# Patient Record
Sex: MALE | Race: WHITE | NOT HISPANIC OR LATINO | Employment: FULL TIME | URBAN - METROPOLITAN AREA
[De-identification: names, ages, dates, MRNs, and addresses within clinical notes are randomized per-mention and may not be internally consistent; named-entity substitution may affect disease eponyms.]

---

## 2017-03-16 ENCOUNTER — HOSPITAL ENCOUNTER (OUTPATIENT)
Dept: OTHER | Facility: HOSPITAL | Age: 37
Discharge: HOME | End: 2017-03-16
Attending: NURSE PRACTITIONER | Admitting: NURSE PRACTITIONER

## 2017-12-19 ENCOUNTER — HOSPITAL ENCOUNTER (OUTPATIENT)
Dept: OTHER | Facility: HOSPITAL | Age: 37
Discharge: HOME | End: 2017-12-19
Attending: NURSE PRACTITIONER | Admitting: NURSE PRACTITIONER

## 2017-12-19 ENCOUNTER — HOSPITAL ENCOUNTER (OUTPATIENT)
Dept: OTHER | Facility: HOSPITAL | Age: 37
Setting detail: SPECIMEN
Discharge: HOME | End: 2017-12-19
Attending: NURSE PRACTITIONER | Admitting: NURSE PRACTITIONER

## 2017-12-19 LAB
ALBUMIN SERPL-MCNC: 4.6 G/DL (ref 3.5–4.8)
ALBUMIN/GLOB SERPL: 1.5 {RATIO} (ref 1–1.7)
ALP SERPL-CCNC: 68 IU/L (ref 32–91)
ALT SERPL-CCNC: 34 IU/L (ref 17–63)
ANION GAP SERPL CALC-SCNC: 14.2 MMOL/L (ref 10–20)
AST SERPL-CCNC: 26 IU/L (ref 15–41)
BASOPHILS # BLD AUTO: 0.1 10*3/UL (ref 0–0.2)
BASOPHILS NFR BLD AUTO: 1 % (ref 0–2)
BILIRUB SERPL-MCNC: 0.7 MG/DL (ref 0.3–1.2)
BUN SERPL-MCNC: 13 MG/DL (ref 8–20)
BUN/CREAT SERPL: 13 (ref 6.2–20.3)
CALCIUM SERPL-MCNC: 9.9 MG/DL (ref 8.9–10.3)
CHLORIDE SERPL-SCNC: 99 MMOL/L (ref 101–111)
CHOLEST SERPL-MCNC: 214 MG/DL
CHOLEST/HDLC SERPL: 5.4 {RATIO}
CONV CO2: 28 MMOL/L (ref 22–32)
CONV LDL CHOLESTEROL DIRECT: 140 MG/DL (ref 0–100)
CONV TOTAL PROTEIN: 7.6 G/DL (ref 6.1–7.9)
CREAT UR-MCNC: 1 MG/DL (ref 0.7–1.2)
D DIMER PPP FEU-MCNC: 0.36 MG/L FEU (ref 0.17–0.59)
DIFFERENTIAL METHOD BLD: (no result)
EOSINOPHIL # BLD AUTO: 0.1 10*3/UL (ref 0–0.3)
EOSINOPHIL # BLD AUTO: 1 % (ref 0–3)
ERYTHROCYTE [DISTWIDTH] IN BLOOD BY AUTOMATED COUNT: 13.2 % (ref 11.5–14.5)
GLOBULIN UR ELPH-MCNC: 3 G/DL (ref 2.5–3.8)
GLUCOSE SERPL-MCNC: 85 MG/DL (ref 65–99)
HCT VFR BLD AUTO: 45.9 % (ref 40–54)
HDLC SERPL-MCNC: 40 MG/DL
HGB BLD-MCNC: 15.5 G/DL (ref 14–18)
IRON SATN MFR SERPL: 19 % (ref 20–50)
IRON SERPL-MCNC: 71 UG/DL (ref 45–182)
LDLC/HDLC SERPL: 3.5 {RATIO}
LIPID INTERPRETATION: ABNORMAL
LYMPHOCYTES # BLD AUTO: 3.4 10*3/UL (ref 0.8–4.8)
LYMPHOCYTES NFR BLD AUTO: 34 % (ref 18–42)
MAGNESIUM SERPL-MCNC: 1.7 MG/DL (ref 1.8–2.5)
MCH RBC QN AUTO: 29.6 PG (ref 26–32)
MCHC RBC AUTO-ENTMCNC: 33.8 G/DL (ref 32–36)
MCV RBC AUTO: 87.5 FL (ref 80–94)
MONOCYTES # BLD AUTO: 0.8 10*3/UL (ref 0.1–1.3)
MONOCYTES NFR BLD AUTO: 8 % (ref 2–11)
NEUTROPHILS # BLD AUTO: 5.7 10*3/UL (ref 2.3–8.6)
NEUTROPHILS NFR BLD AUTO: 56 % (ref 50–75)
NRBC BLD AUTO-RTO: 0 /100{WBCS}
NRBC/RBC NFR BLD MANUAL: 0 10*3/UL
PLATELET # BLD AUTO: 359 10*3/UL (ref 150–450)
PMV BLD AUTO: 7.4 FL (ref 7.4–10.4)
POTASSIUM SERPL-SCNC: 4.2 MMOL/L (ref 3.6–5.1)
RBC # BLD AUTO: 5.25 10*6/UL (ref 4.6–6)
SODIUM SERPL-SCNC: 137 MMOL/L (ref 136–144)
TIBC SERPL-MCNC: 375 UG/DL (ref 228–428)
TRIGL SERPL-MCNC: 235 MG/DL
VLDLC SERPL CALC-MCNC: 33.9 MG/DL
WBC # BLD AUTO: 10.1 10*3/UL (ref 4.5–11.5)

## 2019-01-17 ENCOUNTER — HOSPITAL ENCOUNTER (OUTPATIENT)
Dept: OTHER | Facility: HOSPITAL | Age: 39
Setting detail: SPECIMEN
Discharge: HOME | End: 2019-01-17
Attending: NURSE PRACTITIONER | Admitting: NURSE PRACTITIONER

## 2019-01-17 LAB
ALBUMIN SERPL-MCNC: 4.3 G/DL (ref 3.5–4.8)
ALBUMIN/GLOB SERPL: 1.3 {RATIO} (ref 1–1.7)
ALP SERPL-CCNC: 78 IU/L (ref 32–91)
ALT SERPL-CCNC: 35 IU/L (ref 17–63)
ANION GAP SERPL CALC-SCNC: 16 MMOL/L (ref 10–20)
AST SERPL-CCNC: 26 IU/L (ref 15–41)
BASOPHILS # BLD AUTO: 0.1 10*3/UL (ref 0–0.2)
BASOPHILS NFR BLD AUTO: 1 % (ref 0–2)
BILIRUB SERPL-MCNC: 0.6 MG/DL (ref 0.3–1.2)
BUN SERPL-MCNC: 10 MG/DL (ref 8–20)
BUN/CREAT SERPL: 11.1 (ref 6.2–20.3)
CALCIUM SERPL-MCNC: 9.3 MG/DL (ref 8.9–10.3)
CHLORIDE SERPL-SCNC: 99 MMOL/L (ref 101–111)
CHOLEST SERPL-MCNC: 231 MG/DL
CHOLEST/HDLC SERPL: 6.1 {RATIO}
CONV CO2: 22 MMOL/L (ref 22–32)
CONV LDL CHOLESTEROL DIRECT: 151 MG/DL (ref 0–100)
CONV TOTAL PROTEIN: 7.5 G/DL (ref 6.1–7.9)
CREAT UR-MCNC: 0.9 MG/DL (ref 0.7–1.2)
DIFFERENTIAL METHOD BLD: (no result)
EOSINOPHIL # BLD AUTO: 0.1 10*3/UL (ref 0–0.3)
EOSINOPHIL # BLD AUTO: 2 % (ref 0–3)
ERYTHROCYTE [DISTWIDTH] IN BLOOD BY AUTOMATED COUNT: 13.1 % (ref 11.5–14.5)
GLOBULIN UR ELPH-MCNC: 3.2 G/DL (ref 2.5–3.8)
GLUCOSE SERPL-MCNC: 88 MG/DL (ref 65–99)
HCT VFR BLD AUTO: 45.3 % (ref 40–54)
HDLC SERPL-MCNC: 38 MG/DL
HGB BLD-MCNC: 15.2 G/DL (ref 14–18)
LDLC/HDLC SERPL: 4 {RATIO}
LIPID INTERPRETATION: ABNORMAL
LYMPHOCYTES # BLD AUTO: 3 10*3/UL (ref 0.8–4.8)
LYMPHOCYTES NFR BLD AUTO: 36 % (ref 18–42)
MAGNESIUM SERPL-MCNC: 1.9 MG/DL (ref 1.8–2.5)
MCH RBC QN AUTO: 29.5 PG (ref 26–32)
MCHC RBC AUTO-ENTMCNC: 33.6 G/DL (ref 32–36)
MCV RBC AUTO: 87.7 FL (ref 80–94)
MONOCYTES # BLD AUTO: 0.9 10*3/UL (ref 0.1–1.3)
MONOCYTES NFR BLD AUTO: 10 % (ref 2–11)
NEUTROPHILS # BLD AUTO: 4.3 10*3/UL (ref 2.3–8.6)
NEUTROPHILS NFR BLD AUTO: 51 % (ref 50–75)
NRBC BLD AUTO-RTO: 0 /100{WBCS}
NRBC/RBC NFR BLD MANUAL: 0 10*3/UL
PLATELET # BLD AUTO: 344 10*3/UL (ref 150–450)
PMV BLD AUTO: 7.7 FL (ref 7.4–10.4)
POTASSIUM SERPL-SCNC: 4 MMOL/L (ref 3.6–5.1)
RBC # BLD AUTO: 5.17 10*6/UL (ref 4.6–6)
SODIUM SERPL-SCNC: 133 MMOL/L (ref 136–144)
TRIGL SERPL-MCNC: 235 MG/DL
VLDLC SERPL CALC-MCNC: 41.8 MG/DL
WBC # BLD AUTO: 8.4 10*3/UL (ref 4.5–11.5)

## 2019-03-20 ENCOUNTER — OFFICE (AMBULATORY)
Dept: URBAN - METROPOLITAN AREA CLINIC 64 | Facility: CLINIC | Age: 39
End: 2019-03-20

## 2019-03-20 VITALS
WEIGHT: 294 LBS | HEIGHT: 73 IN | DIASTOLIC BLOOD PRESSURE: 80 MMHG | SYSTOLIC BLOOD PRESSURE: 123 MMHG | HEART RATE: 89 BPM

## 2019-03-20 DIAGNOSIS — R19.7 DIARRHEA, UNSPECIFIED: ICD-10-CM

## 2019-03-20 DIAGNOSIS — K62.5 HEMORRHAGE OF ANUS AND RECTUM: ICD-10-CM

## 2019-03-20 DIAGNOSIS — R10.30 LOWER ABDOMINAL PAIN, UNSPECIFIED: ICD-10-CM

## 2019-03-20 PROCEDURE — 99203 OFFICE O/P NEW LOW 30 MIN: CPT | Performed by: NURSE PRACTITIONER

## 2019-03-20 RX ORDER — DICYCLOMINE HYDROCHLORIDE 10 MG/1
40 CAPSULE ORAL
Qty: 60 | Refills: 11 | Status: COMPLETED
Start: 2019-03-20 | End: 2019-07-10

## 2019-06-17 ENCOUNTER — OFFICE VISIT (OUTPATIENT)
Dept: FAMILY MEDICINE CLINIC | Facility: CLINIC | Age: 39
End: 2019-06-17

## 2019-06-17 VITALS
SYSTOLIC BLOOD PRESSURE: 147 MMHG | HEART RATE: 73 BPM | DIASTOLIC BLOOD PRESSURE: 84 MMHG | TEMPERATURE: 98.9 F | OXYGEN SATURATION: 96 % | WEIGHT: 292.2 LBS | HEIGHT: 73 IN | BODY MASS INDEX: 38.73 KG/M2

## 2019-06-17 DIAGNOSIS — J06.9 ACUTE URI: Primary | ICD-10-CM

## 2019-06-17 PROBLEM — E83.42 HYPOMAGNESEMIA: Status: ACTIVE | Noted: 2019-01-17

## 2019-06-17 PROCEDURE — 99213 OFFICE O/P EST LOW 20 MIN: CPT | Performed by: NURSE PRACTITIONER

## 2019-06-17 RX ORDER — LISINOPRIL 10 MG/1
TABLET ORAL
COMMUNITY
Start: 2013-09-27 | End: 2020-03-09 | Stop reason: SDUPTHER

## 2019-06-17 RX ORDER — AMOXICILLIN AND CLAVULANATE POTASSIUM 875; 125 MG/1; MG/1
1 TABLET, FILM COATED ORAL 2 TIMES DAILY
Qty: 20 TABLET | Refills: 0 | Status: SHIPPED | OUTPATIENT
Start: 2019-06-17 | End: 2019-06-27

## 2019-06-17 RX ORDER — ESCITALOPRAM OXALATE 10 MG/1
TABLET ORAL EVERY 24 HOURS
COMMUNITY
Start: 2018-12-06 | End: 2019-06-26 | Stop reason: SDUPTHER

## 2019-06-17 RX ORDER — METOPROLOL SUCCINATE 50 MG/1
TABLET, EXTENDED RELEASE ORAL
COMMUNITY
Start: 2013-09-20 | End: 2019-10-30 | Stop reason: SDUPTHER

## 2019-06-17 RX ORDER — METHYLPREDNISOLONE 4 MG/1
4 TABLET ORAL DAILY
Qty: 5 TABLET | Refills: 0 | Status: SHIPPED | OUTPATIENT
Start: 2019-06-17 | End: 2019-06-22

## 2019-06-17 RX ORDER — PANTOPRAZOLE SODIUM 40 MG/1
TABLET, DELAYED RELEASE ORAL
COMMUNITY
Start: 2019-01-17 | End: 2019-08-12 | Stop reason: SDUPTHER

## 2019-06-17 NOTE — PROGRESS NOTES
Subjective   Lonnie Green is a 39 y.o. male who presents today with URI symptoms x 1 month.     Sinusitis   Associated symptoms include congestion and sinus pressure. Pertinent negatives include no chills, coughing, ear pain, sneezing, sore throat or swollen glands.        The following portions of the patient's history were reviewed and updated as appropriate: allergies, current medications, past family history, past medical history, past social history, past surgical history and problem list.    Review of Systems   Constitutional: Negative for activity change, chills and fatigue.   HENT: Positive for congestion, postnasal drip and sinus pressure. Negative for ear discharge, ear pain, facial swelling, sneezing, sore throat, tinnitus and trouble swallowing.    Eyes: Negative for blurred vision, double vision, photophobia, itching and visual disturbance.   Respiratory: Negative for apnea, cough, choking, wheezing and stridor.    Cardiovascular: Negative for chest pain.       Objective   Physical Exam   Constitutional: He appears well-developed and well-nourished.   HENT:   Head: Normocephalic and atraumatic.   Right Ear: External ear normal.   Left Ear: External ear normal.   Nose: Rhinorrhea and congestion present. Right sinus exhibits maxillary sinus tenderness and frontal sinus tenderness. Left sinus exhibits frontal sinus tenderness.   Eyes: Conjunctivae and EOM are normal. Pupils are equal, round, and reactive to light. Right eye exhibits no discharge. Left eye exhibits no discharge.   Cardiovascular: Normal rate, regular rhythm and normal heart sounds.   Pulmonary/Chest: Effort normal and breath sounds normal. No stridor. No respiratory distress. He has no wheezes. He has no rales. He exhibits no tenderness.         Assessment/Plan   Lonnie was seen today for cough, sinusitis and nasal congestion.    Diagnoses and all orders for this visit:    Acute URI  Comments:  Treat with augmentin x 10 days.  Medrol dose  pack    Other orders  -     amoxicillin-clavulanate (AUGMENTIN) 875-125 MG per tablet; Take 1 tablet by mouth 2 (Two) Times a Day for 10 days.  -     methylPREDNISolone (MEDROL) 4 MG tablet; Take 1 tablet by mouth Daily for 5 doses.

## 2019-06-17 NOTE — PATIENT INSTRUCTIONS

## 2019-06-26 RX ORDER — ESCITALOPRAM OXALATE 10 MG/1
10 TABLET ORAL DAILY
Qty: 90 TABLET | Refills: 0 | Status: SHIPPED | OUTPATIENT
Start: 2019-06-26 | End: 2019-11-06 | Stop reason: SDUPTHER

## 2019-07-11 ENCOUNTER — ON CAMPUS - OUTPATIENT (AMBULATORY)
Dept: URBAN - METROPOLITAN AREA HOSPITAL 2 | Facility: HOSPITAL | Age: 39
End: 2019-07-11
Payer: COMMERCIAL

## 2019-07-11 ENCOUNTER — OFFICE (AMBULATORY)
Dept: URBAN - METROPOLITAN AREA PATHOLOGY 4 | Facility: PATHOLOGY | Age: 39
End: 2019-07-11
Payer: COMMERCIAL

## 2019-07-11 VITALS
TEMPERATURE: 97.2 F | WEIGHT: 208 LBS | HEART RATE: 76 BPM | SYSTOLIC BLOOD PRESSURE: 164 MMHG | SYSTOLIC BLOOD PRESSURE: 156 MMHG | HEART RATE: 86 BPM | HEART RATE: 96 BPM | DIASTOLIC BLOOD PRESSURE: 103 MMHG | RESPIRATION RATE: 16 BRPM | HEART RATE: 84 BPM | SYSTOLIC BLOOD PRESSURE: 131 MMHG | OXYGEN SATURATION: 98 % | HEART RATE: 88 BPM | SYSTOLIC BLOOD PRESSURE: 127 MMHG | DIASTOLIC BLOOD PRESSURE: 79 MMHG | RESPIRATION RATE: 18 BRPM | SYSTOLIC BLOOD PRESSURE: 126 MMHG | DIASTOLIC BLOOD PRESSURE: 87 MMHG | HEART RATE: 68 BPM | OXYGEN SATURATION: 97 % | DIASTOLIC BLOOD PRESSURE: 78 MMHG | DIASTOLIC BLOOD PRESSURE: 72 MMHG | DIASTOLIC BLOOD PRESSURE: 74 MMHG | SYSTOLIC BLOOD PRESSURE: 152 MMHG | SYSTOLIC BLOOD PRESSURE: 128 MMHG | RESPIRATION RATE: 20 BRPM | HEIGHT: 73 IN | HEART RATE: 73 BPM | OXYGEN SATURATION: 96 % | SYSTOLIC BLOOD PRESSURE: 134 MMHG

## 2019-07-11 DIAGNOSIS — D12.3 BENIGN NEOPLASM OF TRANSVERSE COLON: ICD-10-CM

## 2019-07-11 DIAGNOSIS — D12.2 BENIGN NEOPLASM OF ASCENDING COLON: ICD-10-CM

## 2019-07-11 DIAGNOSIS — R19.7 DIARRHEA, UNSPECIFIED: ICD-10-CM

## 2019-07-11 DIAGNOSIS — R10.30 LOWER ABDOMINAL PAIN, UNSPECIFIED: ICD-10-CM

## 2019-07-11 DIAGNOSIS — K62.5 HEMORRHAGE OF ANUS AND RECTUM: ICD-10-CM

## 2019-07-11 DIAGNOSIS — K64.0 FIRST DEGREE HEMORRHOIDS: ICD-10-CM

## 2019-07-11 LAB
GI HISTOLOGY: A. UNSPECIFIED: (no result)
GI HISTOLOGY: B. UNSPECIFIED: (no result)
GI HISTOLOGY: C. UNSPECIFIED: (no result)
GI HISTOLOGY: PDF REPORT: (no result)

## 2019-07-11 PROCEDURE — 45380 COLONOSCOPY AND BIOPSY: CPT | Mod: 59 | Performed by: INTERNAL MEDICINE

## 2019-07-11 PROCEDURE — 45385 COLONOSCOPY W/LESION REMOVAL: CPT | Performed by: INTERNAL MEDICINE

## 2019-07-11 PROCEDURE — 88305 TISSUE EXAM BY PATHOLOGIST: CPT | Performed by: INTERNAL MEDICINE

## 2019-07-11 NOTE — SERVICEHPINOTES
LIBRA MONDRAGON  is a  39  male   who presents today for a  Colonoscopy   for   the indications listed below. The updated Patient Profile was reviewed prior to the procedure, in conjunction with the Physical Exam, including medical conditions, surgical procedures, medications, allergies, family history and social history. See Physical Exam time stamp below for date and time of HPI completion.Pre-operatively, I reviewed the indication(s) for the procedure, the risks of the procedure [including but not limited to: unexpected bleeding possibly requiring hospitalization and/or unplanned repeat procedures, perforation possibly requiring surgical treatment, missed lesions and complications of sedation/MAC (also explained by anesthesia staff)]. I have evaluated the patient for risks associated with the planned anesthesia and the procedure to be performed and find the patient an acceptable candidate for IV sedation.Multiple opportunities were provided for any questions or concerns, and all questions were answered satisfactorily before any anesthesia was administered. We will proceed with the planned procedure.BR

## 2019-08-13 RX ORDER — PANTOPRAZOLE SODIUM 40 MG/1
40 TABLET, DELAYED RELEASE ORAL DAILY
Qty: 90 TABLET | Refills: 0 | Status: SHIPPED | OUTPATIENT
Start: 2019-08-13 | End: 2019-10-30 | Stop reason: SDUPTHER

## 2019-10-30 RX ORDER — PANTOPRAZOLE SODIUM 40 MG/1
40 TABLET, DELAYED RELEASE ORAL DAILY
Qty: 90 TABLET | Refills: 0 | Status: SHIPPED | OUTPATIENT
Start: 2019-10-30 | End: 2020-02-17 | Stop reason: SDUPTHER

## 2019-10-30 RX ORDER — METOPROLOL SUCCINATE 50 MG/1
50 TABLET, EXTENDED RELEASE ORAL DAILY
Qty: 90 TABLET | Refills: 0 | Status: SHIPPED | OUTPATIENT
Start: 2019-10-30 | End: 2020-01-27 | Stop reason: SDUPTHER

## 2019-11-06 RX ORDER — ESCITALOPRAM OXALATE 10 MG/1
10 TABLET ORAL DAILY
Qty: 90 TABLET | Refills: 0 | Status: SHIPPED | OUTPATIENT
Start: 2019-11-06 | End: 2020-03-10 | Stop reason: SDUPTHER

## 2020-01-28 RX ORDER — METOPROLOL SUCCINATE 50 MG/1
50 TABLET, EXTENDED RELEASE ORAL DAILY
Qty: 90 TABLET | Refills: 0 | Status: SHIPPED | OUTPATIENT
Start: 2020-01-28 | End: 2020-03-24

## 2020-02-17 RX ORDER — PANTOPRAZOLE SODIUM 40 MG/1
40 TABLET, DELAYED RELEASE ORAL DAILY
Qty: 30 TABLET | Refills: 0 | Status: SHIPPED | OUTPATIENT
Start: 2020-02-17 | End: 2020-03-16

## 2020-03-03 ENCOUNTER — TELEPHONE (OUTPATIENT)
Dept: FAMILY MEDICINE CLINIC | Facility: CLINIC | Age: 40
End: 2020-03-03

## 2020-03-03 NOTE — TELEPHONE ENCOUNTER
rx called and said they have faxed and left message for ma to call and approve the rx the needs to be refilled for patient, thanks

## 2020-03-04 NOTE — TELEPHONE ENCOUNTER
Attempted to contact the patient.  No answer, so I left a vm letting him know that he needs to call and tell me what he needs refilled and also needs to schedule appt with krys.

## 2020-03-09 RX ORDER — LISINOPRIL 10 MG/1
10 TABLET ORAL DAILY
Qty: 30 TABLET | Refills: 0 | Status: SHIPPED | OUTPATIENT
Start: 2020-03-09 | End: 2020-03-24

## 2020-03-10 RX ORDER — ESCITALOPRAM OXALATE 10 MG/1
10 TABLET ORAL DAILY
Qty: 90 TABLET | Refills: 0 | Status: SHIPPED | OUTPATIENT
Start: 2020-03-10 | End: 2020-04-14 | Stop reason: SDUPTHER

## 2020-03-16 RX ORDER — PANTOPRAZOLE SODIUM 40 MG/1
40 TABLET, DELAYED RELEASE ORAL DAILY
Qty: 30 TABLET | Refills: 0 | Status: SHIPPED | OUTPATIENT
Start: 2020-03-16 | End: 2020-04-14 | Stop reason: SDUPTHER

## 2020-03-24 RX ORDER — LISINOPRIL 10 MG/1
TABLET ORAL
Qty: 90 TABLET | Refills: 0 | Status: SHIPPED | OUTPATIENT
Start: 2020-03-24 | End: 2020-04-14 | Stop reason: SDUPTHER

## 2020-03-24 RX ORDER — METOPROLOL SUCCINATE 50 MG/1
TABLET, EXTENDED RELEASE ORAL
Qty: 90 TABLET | Refills: 0 | Status: SHIPPED | OUTPATIENT
Start: 2020-03-24 | End: 2020-06-23

## 2020-04-14 ENCOUNTER — OFFICE VISIT (OUTPATIENT)
Dept: FAMILY MEDICINE CLINIC | Facility: CLINIC | Age: 40
End: 2020-04-14

## 2020-04-14 VITALS — HEIGHT: 73 IN | WEIGHT: 277 LBS | BODY MASS INDEX: 36.71 KG/M2

## 2020-04-14 DIAGNOSIS — E78.2 MIXED HYPERLIPIDEMIA: ICD-10-CM

## 2020-04-14 DIAGNOSIS — K21.9 GASTROESOPHAGEAL REFLUX DISEASE WITHOUT ESOPHAGITIS: ICD-10-CM

## 2020-04-14 DIAGNOSIS — F41.9 ANXIETY: ICD-10-CM

## 2020-04-14 DIAGNOSIS — I10 ESSENTIAL HYPERTENSION: Primary | ICD-10-CM

## 2020-04-14 PROCEDURE — 99443 PR PHYS/QHP TELEPHONE EVALUATION 21-30 MIN: CPT | Performed by: NURSE PRACTITIONER

## 2020-04-14 RX ORDER — LISINOPRIL 10 MG/1
10 TABLET ORAL DAILY
Qty: 90 TABLET | Refills: 0 | Status: SHIPPED | OUTPATIENT
Start: 2020-04-14 | End: 2020-07-27 | Stop reason: SDUPTHER

## 2020-04-14 RX ORDER — PANTOPRAZOLE SODIUM 40 MG/1
40 TABLET, DELAYED RELEASE ORAL DAILY
Qty: 90 TABLET | Refills: 0 | Status: SHIPPED | OUTPATIENT
Start: 2020-04-14 | End: 2020-07-08

## 2020-04-14 RX ORDER — ESCITALOPRAM OXALATE 10 MG/1
10 TABLET ORAL DAILY
Qty: 90 TABLET | Refills: 0 | Status: SHIPPED | OUTPATIENT
Start: 2020-04-14 | End: 2020-07-08

## 2020-04-14 NOTE — PROGRESS NOTES
Chief Complaint   Patient presents with   • Establish Care   • Med Refill      You have chosen to receive care through a telephone visit. Do you consent to use a telephone visit for your medical care today? Yes      HPI    HTN, stable on meds and takes as directed, denies chest pain, headache, shortness of air, palpitations and swelling of extremities.  Checks BP at home and ranges [id number] daily, reports heart rate lately ranges from , with slight increase in anxiety due to current pandemic situation.    Hyperlipidemia, pt started on atorvastatin last year, could not lisa d/t s/e. He denies constipation, diarrhea, GI upset, fatigue, exercise intolerance, dyspnea, palpitations, syncope and pedal edema.  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      Anxiety, feels stable on Lexapro, needs rf.  Patient denies significant weight loss/gain, insomnia, hypersomnia, psychomotor agitation, psychomotor retardation, fatigue (loss of energy), feelings of worthlessness (guilt), impaired concentration (indecisiveness), thoughts of death or suicide.      GERD, stable on medication, denies nausea, vomiting, constipation, abdominal pain and diarrhea. Needs protonix rf.              Past Medical History:   Diagnosis Date   • GERD (gastroesophageal reflux disease)    • Hypertension    • Incisional hernia    • Pneumonia    • Tachycardia    • Umbilical hernia      Past Surgical History:   Procedure Laterality Date   • CHOLECYSTECTOMY  06/26/2013   • INCISIONAL HERNIA REPAIR  02/02/2015   • TONSILLECTOMY  1990     Family History   Problem Relation Age of Onset   • Diabetes Mother    • Stroke Mother    • Hypertension Father    • Hypertension Other    • Cancer Other         Abstraction from [facility] - Lung Cancer     Social History     Tobacco Use   • Smoking status: Former Smoker   • Smokeless tobacco: Never Used   Substance Use Topics   • Alcohol use: Yes     Frequency: Monthly or less         Current Outpatient  Medications:   •  escitalopram (Lexapro) 10 MG tablet, Take 1 tablet by mouth Daily., Disp: 90 tablet, Rfl: 0  •  lisinopril (PRINIVIL,ZESTRIL) 10 MG tablet, Take 1 tablet by mouth Daily., Disp: 90 tablet, Rfl: 0  •  metoprolol succinate XL (TOPROL-XL) 50 MG 24 hr tablet, TAKE 1 TABLET BY MOUTH ONCE DAILY, Disp: 90 tablet, Rfl: 0  •  pantoprazole (PROTONIX) 40 MG EC tablet, Take 1 tablet by mouth Daily., Disp: 90 tablet, Rfl: 0    PHQ-2 Depression Screening  Little interest or pleasure in doing things? 0   Feeling down, depressed, or hopeless? 0   PHQ-2 Total Score 0     PHQ-9 Depression Screening  Little interest or pleasure in doing things? 0   Feeling down, depressed, or hopeless? 0   Trouble falling or staying asleep, or sleeping too much?     Feeling tired or having little energy?     Poor appetite or overeating?     Feeling bad about yourself - or that you are a failure or have let yourself or your family down?     Trouble concentrating on things, such as reading the newspaper or watching television?     Moving or speaking so slowly that other people could have noticed? Or the opposite - being so fidgety or restless that you have been moving around a lot more than usual?     Thoughts that you would be better off dead, or of hurting yourself in some way?     PHQ-9 Total Score 0   If you checked off any problems, how difficult have these problems made it for you to do your work, take care of things at home, or get along with other people?       The following portions of the patient's history were reviewed and updated as appropriate: allergies, current medications, past family history, past medical history, past social history, past surgical history and problem list.    Review of Systems   Constitutional: Negative for chills, fatigue and fever.   HENT: Negative for dental problem, ear pain, sinus pressure and sore throat.    Eyes: Negative for visual disturbance.   Respiratory: Negative for cough, shortness of  "breath and wheezing.    Cardiovascular: Negative for chest pain, palpitations and leg swelling.        HR up to 100 at times   Gastrointestinal: Negative for abdominal pain, blood in stool, constipation, diarrhea, nausea, vomiting and GERD.   Genitourinary: Negative for difficulty urinating, frequency, urgency and urinary incontinence.   Musculoskeletal: Negative for arthralgias, back pain, gait problem, joint swelling, myalgias and neck pain.   Skin: Negative for dry skin, pallor and rash.   Neurological: Negative for dizziness, seizures, speech difficulty and weakness.   Hematological: Negative for adenopathy.   Psychiatric/Behavioral: Negative for sleep disturbance, depressed mood and stress. The patient is nervous/anxious.         Vitals:    04/14/20 0753   Weight: 126 kg (277 lb)   Height: 185.4 cm (72.99\")     Body mass index is 36.55 kg/m².     Physical Exam   Constitutional: He is oriented to person, place, and time. No distress.   Exam otherwise deferred through video or audio visit   Pulmonary/Chest: Effort normal.   Neurological: He is alert and oriented to person, place, and time.   Psychiatric: He has a normal mood and affect. His behavior is normal. Judgment and thought content normal.        No visits with results within 7 Day(s) from this visit.   Latest known visit with results is:   No results found for any previous visit.       Diagnoses and all orders for this visit:    1. Essential hypertension (Primary)  -     lisinopril (PRINIVIL,ZESTRIL) 10 MG tablet; Take 1 tablet by mouth Daily.  Dispense: 90 tablet; Refill: 0  -     Comprehensive Metabolic Panel; Future  -     CBC (No Diff); Future  -     TSH; Future    2. Mixed hyperlipidemia  -     Lipid Panel; Future    3. Gastroesophageal reflux disease without esophagitis  -     pantoprazole (PROTONIX) 40 MG EC tablet; Take 1 tablet by mouth Daily.  Dispense: 90 tablet; Refill: 0    4. Anxiety  -     escitalopram (Lexapro) 10 MG tablet; Take 1 tablet " by mouth Daily.  Dispense: 90 tablet; Refill: 0      Conditions above stable, rf meds  Return for labs prior to next apt in 3 mo fasting.   Call or return earlier if needed.     This was an audio enabled telemedicine encounter. Total time of discussion was 22 minutes

## 2020-06-23 RX ORDER — METOPROLOL SUCCINATE 50 MG/1
TABLET, EXTENDED RELEASE ORAL
Qty: 90 TABLET | Refills: 0 | Status: SHIPPED | OUTPATIENT
Start: 2020-06-23 | End: 2020-09-17

## 2020-07-06 ENCOUNTER — CLINICAL SUPPORT (OUTPATIENT)
Dept: FAMILY MEDICINE CLINIC | Facility: CLINIC | Age: 40
End: 2020-07-06

## 2020-07-06 DIAGNOSIS — E78.2 MIXED HYPERLIPIDEMIA: ICD-10-CM

## 2020-07-06 DIAGNOSIS — I10 ESSENTIAL HYPERTENSION: ICD-10-CM

## 2020-07-08 DIAGNOSIS — F41.9 ANXIETY: ICD-10-CM

## 2020-07-08 DIAGNOSIS — K21.9 GASTROESOPHAGEAL REFLUX DISEASE WITHOUT ESOPHAGITIS: ICD-10-CM

## 2020-07-08 RX ORDER — ESCITALOPRAM OXALATE 10 MG/1
10 TABLET ORAL DAILY
Qty: 90 TABLET | Refills: 0 | Status: SHIPPED | OUTPATIENT
Start: 2020-07-08 | End: 2020-12-30

## 2020-07-08 RX ORDER — PANTOPRAZOLE SODIUM 40 MG/1
40 TABLET, DELAYED RELEASE ORAL DAILY
Qty: 90 TABLET | Refills: 0 | Status: SHIPPED | OUTPATIENT
Start: 2020-07-08 | End: 2020-09-28

## 2020-07-27 DIAGNOSIS — I10 ESSENTIAL HYPERTENSION: ICD-10-CM

## 2020-07-27 RX ORDER — LISINOPRIL 10 MG/1
10 TABLET ORAL DAILY
Qty: 90 TABLET | Refills: 0 | Status: SHIPPED | OUTPATIENT
Start: 2020-07-27 | End: 2021-01-07 | Stop reason: SDUPTHER

## 2020-09-17 RX ORDER — METOPROLOL SUCCINATE 50 MG/1
TABLET, EXTENDED RELEASE ORAL
Qty: 90 TABLET | Refills: 0 | Status: SHIPPED | OUTPATIENT
Start: 2020-09-17 | End: 2020-12-23 | Stop reason: SDUPTHER

## 2020-09-28 DIAGNOSIS — K21.9 GASTROESOPHAGEAL REFLUX DISEASE WITHOUT ESOPHAGITIS: ICD-10-CM

## 2020-09-28 RX ORDER — PANTOPRAZOLE SODIUM 40 MG/1
40 TABLET, DELAYED RELEASE ORAL DAILY
Qty: 90 TABLET | Refills: 0 | Status: SHIPPED | OUTPATIENT
Start: 2020-09-28 | End: 2020-12-30

## 2020-12-23 ENCOUNTER — TELEPHONE (OUTPATIENT)
Dept: FAMILY MEDICINE CLINIC | Facility: CLINIC | Age: 40
End: 2020-12-23

## 2020-12-23 DIAGNOSIS — I10 ESSENTIAL HYPERTENSION: ICD-10-CM

## 2020-12-23 RX ORDER — METOPROLOL SUCCINATE 50 MG/1
50 TABLET, EXTENDED RELEASE ORAL DAILY
Qty: 90 TABLET | Refills: 0 | Status: SHIPPED | OUTPATIENT
Start: 2020-12-23 | End: 2021-01-07 | Stop reason: SDUPTHER

## 2020-12-23 NOTE — TELEPHONE ENCOUNTER
Sent, pt has several other meds that likely need refilled. He needs appt please for re-eval. Thanks.

## 2020-12-23 NOTE — TELEPHONE ENCOUNTER
Pt called today to request refill of metoprolol.  The request was sent to you on 12/21/20.  Can you refill today if you have the chance?  Thank you.

## 2020-12-30 DIAGNOSIS — F41.9 ANXIETY: ICD-10-CM

## 2020-12-30 DIAGNOSIS — K21.9 GASTROESOPHAGEAL REFLUX DISEASE WITHOUT ESOPHAGITIS: ICD-10-CM

## 2020-12-30 RX ORDER — ESCITALOPRAM OXALATE 10 MG/1
10 TABLET ORAL DAILY
Qty: 90 TABLET | Refills: 0 | Status: SHIPPED | OUTPATIENT
Start: 2020-12-30 | End: 2021-01-07 | Stop reason: SDUPTHER

## 2020-12-30 RX ORDER — PANTOPRAZOLE SODIUM 40 MG/1
40 TABLET, DELAYED RELEASE ORAL DAILY
Qty: 90 TABLET | Refills: 0 | Status: SHIPPED | OUTPATIENT
Start: 2020-12-30 | End: 2021-01-07 | Stop reason: SDUPTHER

## 2020-12-30 NOTE — TELEPHONE ENCOUNTER
LOV 4/14/20  Next OV 1/7/21    Last refill:  Pantoprazole 9/28/20 #90 with no refills  escitalopram 7/8/20 #90 with no refills

## 2021-01-07 ENCOUNTER — OFFICE VISIT (OUTPATIENT)
Dept: FAMILY MEDICINE CLINIC | Facility: CLINIC | Age: 41
End: 2021-01-07

## 2021-01-07 VITALS
TEMPERATURE: 96.9 F | WEIGHT: 302.6 LBS | DIASTOLIC BLOOD PRESSURE: 87 MMHG | HEART RATE: 85 BPM | SYSTOLIC BLOOD PRESSURE: 129 MMHG | HEIGHT: 74 IN | OXYGEN SATURATION: 96 % | BODY MASS INDEX: 38.84 KG/M2

## 2021-01-07 DIAGNOSIS — Z00.00 PREVENTATIVE HEALTH CARE: ICD-10-CM

## 2021-01-07 DIAGNOSIS — I10 ESSENTIAL HYPERTENSION: ICD-10-CM

## 2021-01-07 DIAGNOSIS — F41.9 ANXIETY: ICD-10-CM

## 2021-01-07 DIAGNOSIS — K21.00 GASTROESOPHAGEAL REFLUX DISEASE WITH ESOPHAGITIS WITHOUT HEMORRHAGE: Primary | ICD-10-CM

## 2021-01-07 DIAGNOSIS — M79.661 PAIN AND SWELLING OF RIGHT LOWER LEG: ICD-10-CM

## 2021-01-07 DIAGNOSIS — M79.89 PAIN AND SWELLING OF RIGHT LOWER LEG: ICD-10-CM

## 2021-01-07 DIAGNOSIS — E78.2 MIXED HYPERLIPIDEMIA: ICD-10-CM

## 2021-01-07 PROCEDURE — 99214 OFFICE O/P EST MOD 30 MIN: CPT | Performed by: NURSE PRACTITIONER

## 2021-01-07 RX ORDER — PANTOPRAZOLE SODIUM 40 MG/1
40 TABLET, DELAYED RELEASE ORAL 2 TIMES DAILY
Qty: 180 TABLET | Refills: 1 | Status: SHIPPED | OUTPATIENT
Start: 2021-01-07 | End: 2021-07-12

## 2021-01-07 RX ORDER — ESCITALOPRAM OXALATE 10 MG/1
10 TABLET ORAL DAILY
Qty: 90 TABLET | Refills: 1 | Status: SHIPPED | OUTPATIENT
Start: 2021-01-07 | End: 2021-10-22

## 2021-01-07 RX ORDER — LISINOPRIL 10 MG/1
10 TABLET ORAL DAILY
Qty: 90 TABLET | Refills: 1 | Status: SHIPPED | OUTPATIENT
Start: 2021-01-07 | End: 2021-11-16

## 2021-01-07 RX ORDER — METOPROLOL SUCCINATE 50 MG/1
50 TABLET, EXTENDED RELEASE ORAL DAILY
Qty: 90 TABLET | Refills: 1 | Status: SHIPPED | OUTPATIENT
Start: 2021-01-07 | End: 2021-07-12

## 2021-01-07 NOTE — PROGRESS NOTES
"Chief Complaint  Hypertension, Hyperlipidemia, Anxiety, Follow-up (from 4/14/20), Heartburn (feels like med isn't working), and Leg Pain (rt leg, was told by  it was a muscle strain in oct but still hurting.)    Subjective          Lonnie Green presents to [facility] for   History of Present Illness    HTN, stable on meds and takes as directed, denies chest pain, headache, shortness of air, palpitations and does report swelling of the RLE w/ calf pain.     Hyperlipidemia, mild, not on statin. The patient denies muscle aches, constipation, diarrhea, GI upset, fatigue, chest pain/pressure, exercise intolerance, dyspnea, palpitations, syncope      GERD, pantop not effective once daily, tried omeprazole, name brand prilosec, nexium and esomeprazole all ineffective, dexilant was effective but too expensive-pt on high deductible plan. He drinks strong coffee through the day, no spicy foods, with daily reflux denies nausea, vomiting, constipation, abdominal pain and diarrhea.    Anxiety, stable on lexapro, patient denies significant weight loss/gain, insomnia, hypersomnia, psychomotor agitation, psychomotor retardation, fatigue (loss of energy), feelings of worthlessness (guilt), impaired concentration (indecisiveness), thoughts of death or suicide.       R calf pain, ~10 wks ago pt reached on tip toes for an object and felt a pop in his calf. He went to  and was given nsaid but flared GERD and he stopped. Today reports still with swelling in RLE and difficulty with ambulation, father with hx of DVT    Objective   Vital Signs:   /87 (BP Location: Right arm, Patient Position: Sitting, Cuff Size: Large Adult)   Pulse 85   Temp 96.9 °F (36.1 °C) (Skin)   Ht 188 cm (74.02\")   Wt (!) 137 kg (302 lb 9.6 oz)   SpO2 96%   BMI 38.83 kg/m²     Physical Exam  Vitals signs and nursing note reviewed.   Constitutional:       General: He is not in acute distress.     Appearance: He is " well-developed. He is not diaphoretic.   HENT:      Head: Normocephalic and atraumatic.   Eyes:      Pupils: Pupils are equal, round, and reactive to light.   Neck:      Musculoskeletal: Normal range of motion.      Thyroid: No thyromegaly.   Cardiovascular:      Rate and Rhythm: Normal rate and regular rhythm.      Heart sounds: Normal heart sounds. No murmur.   Pulmonary:      Effort: Pulmonary effort is normal. No respiratory distress.      Breath sounds: Normal breath sounds.   Abdominal:      General: Bowel sounds are normal. There is no distension.      Palpations: Abdomen is soft.      Tenderness: There is no abdominal tenderness.   Musculoskeletal: Normal range of motion.      Right lower leg: Edema (trace pitting, lower calf firm and slightly tender, no erythema. slight tenderness in achilles region. no knee pain with palpation. ) present.   Skin:     General: Skin is warm and dry.      Findings: No erythema.   Neurological:      Mental Status: He is alert and oriented to person, place, and time.   Psychiatric:         Behavior: Behavior normal.         Thought Content: Thought content normal.         Judgment: Judgment normal.        Result Review :                   Assessment and Plan    Problem List Items Addressed This Visit     None      Visit Diagnoses     Gastroesophageal reflux disease with esophagitis without hemorrhage    -  Primary    Relevant Medications    pantoprazole (PROTONIX) 40 MG EC tablet    Essential hypertension        Relevant Medications    lisinopril (PRINIVIL,ZESTRIL) 10 MG tablet    metoprolol succinate XL (TOPROL-XL) 50 MG 24 hr tablet    Other Relevant Orders    Lipid Panel    Comprehensive Metabolic Panel    CBC (No Diff)    TSH    Mixed hyperlipidemia        Relevant Orders    Lipid Panel    Anxiety        Relevant Medications    escitalopram (LEXAPRO) 10 MG tablet    Preventative health care        Relevant Orders    Hemoglobin A1c    Pain and swelling of right lower leg         Relevant Orders    Duplex Venous Lower Extremity - Right CAR        1. Increase pantoprazole to BID for 2 weeks, then daily but BID prn. rec decrease amount of coffee daily and/or make less strong, try adding milk, increase water intake.   2. Consider GI referral if wonb  3. rf lisinopril and metop, bp stable  4. rf lexapro, anxiety stable  5. Check u/s of RLE, r/o DVT, consider CT vs MRI if indeterminate.   6. Reviewed previous labs  7. Pt will get rtc in am for fasting labs as ordered, will notify results.     I spent 30 minutes caring for Lonnie on this date of service. This time includes time spent by me in the following activities:preparing for the visit, reviewing tests, obtaining and/or reviewing a separately obtained history, performing a medically appropriate examination and/or evaluation , counseling and educating the patient/family/caregiver, ordering medications, tests, or procedures and documenting information in the medical record     Follow Up   Return in about 6 months (around 7/7/2021) for HTN, lipids, anxiety.  Patient was given instructions and counseling regarding his condition or for health maintenance advice. Please see specific information pulled into the AVS if appropriate.

## 2021-01-08 ENCOUNTER — LAB (OUTPATIENT)
Dept: FAMILY MEDICINE CLINIC | Facility: CLINIC | Age: 41
End: 2021-01-08

## 2021-01-08 DIAGNOSIS — I10 ESSENTIAL HYPERTENSION: ICD-10-CM

## 2021-01-08 DIAGNOSIS — E78.2 MIXED HYPERLIPIDEMIA: ICD-10-CM

## 2021-01-08 DIAGNOSIS — Z00.00 PREVENTATIVE HEALTH CARE: ICD-10-CM

## 2021-01-08 PROBLEM — F41.9 ANXIETY: Status: ACTIVE | Noted: 2021-01-08

## 2021-01-08 PROBLEM — K21.00 GASTROESOPHAGEAL REFLUX DISEASE WITH ESOPHAGITIS WITHOUT HEMORRHAGE: Status: ACTIVE | Noted: 2021-01-08

## 2021-01-08 PROCEDURE — 83036 HEMOGLOBIN GLYCOSYLATED A1C: CPT | Performed by: NURSE PRACTITIONER

## 2021-01-08 PROCEDURE — 80061 LIPID PANEL: CPT | Performed by: NURSE PRACTITIONER

## 2021-01-08 PROCEDURE — 84443 ASSAY THYROID STIM HORMONE: CPT | Performed by: NURSE PRACTITIONER

## 2021-01-08 PROCEDURE — 85027 COMPLETE CBC AUTOMATED: CPT | Performed by: NURSE PRACTITIONER

## 2021-01-08 PROCEDURE — 36415 COLL VENOUS BLD VENIPUNCTURE: CPT | Performed by: NURSE PRACTITIONER

## 2021-01-08 PROCEDURE — 80053 COMPREHEN METABOLIC PANEL: CPT | Performed by: NURSE PRACTITIONER

## 2021-01-09 LAB
ALBUMIN SERPL-MCNC: 4.5 G/DL (ref 3.5–5.2)
ALBUMIN/GLOB SERPL: 1.4 G/DL
ALP SERPL-CCNC: 73 U/L (ref 39–117)
ALT SERPL W P-5'-P-CCNC: 23 U/L (ref 1–41)
ANION GAP SERPL CALCULATED.3IONS-SCNC: 10.8 MMOL/L (ref 5–15)
AST SERPL-CCNC: 21 U/L (ref 1–40)
BILIRUB SERPL-MCNC: 0.4 MG/DL (ref 0–1.2)
BUN SERPL-MCNC: 15 MG/DL (ref 6–20)
BUN/CREAT SERPL: 21.4 (ref 7–25)
CALCIUM SPEC-SCNC: 9.3 MG/DL (ref 8.6–10.5)
CHLORIDE SERPL-SCNC: 104 MMOL/L (ref 98–107)
CHOLEST SERPL-MCNC: 216 MG/DL (ref 0–200)
CO2 SERPL-SCNC: 27.2 MMOL/L (ref 22–29)
CREAT SERPL-MCNC: 0.7 MG/DL (ref 0.76–1.27)
DEPRECATED RDW RBC AUTO: 38.6 FL (ref 37–54)
ERYTHROCYTE [DISTWIDTH] IN BLOOD BY AUTOMATED COUNT: 12.4 % (ref 12.3–15.4)
GFR SERPL CREATININE-BSD FRML MDRD: 125 ML/MIN/1.73
GLOBULIN UR ELPH-MCNC: 3.3 GM/DL
GLUCOSE SERPL-MCNC: 90 MG/DL (ref 65–99)
HCT VFR BLD AUTO: 41.9 % (ref 37.5–51)
HDLC SERPL-MCNC: 36 MG/DL (ref 40–60)
HGB BLD-MCNC: 14.6 G/DL (ref 13–17.7)
LDLC SERPL CALC-MCNC: 147 MG/DL (ref 0–100)
LDLC/HDLC SERPL: 4.01 {RATIO}
MCH RBC QN AUTO: 30.2 PG (ref 26.6–33)
MCHC RBC AUTO-ENTMCNC: 34.8 G/DL (ref 31.5–35.7)
MCV RBC AUTO: 86.6 FL (ref 79–97)
PLATELET # BLD AUTO: 356 10*3/MM3 (ref 140–450)
PMV BLD AUTO: 9.8 FL (ref 6–12)
POTASSIUM SERPL-SCNC: 4.6 MMOL/L (ref 3.5–5.2)
PROT SERPL-MCNC: 7.8 G/DL (ref 6–8.5)
RBC # BLD AUTO: 4.84 10*6/MM3 (ref 4.14–5.8)
SODIUM SERPL-SCNC: 142 MMOL/L (ref 136–145)
TRIGL SERPL-MCNC: 179 MG/DL (ref 0–150)
TSH SERPL DL<=0.05 MIU/L-ACNC: 0.99 UIU/ML (ref 0.27–4.2)
VLDLC SERPL-MCNC: 33 MG/DL (ref 5–40)
WBC # BLD AUTO: 8.4 10*3/MM3 (ref 3.4–10.8)

## 2021-01-11 LAB — HBA1C MFR BLD: 5.6 % (ref 3.5–5.6)

## 2021-01-20 DIAGNOSIS — S86.011A PARTIAL TEAR OF RIGHT ACHILLES TENDON, INITIAL ENCOUNTER: Primary | ICD-10-CM

## 2021-01-25 ENCOUNTER — TELEPHONE (OUTPATIENT)
Dept: FAMILY MEDICINE CLINIC | Facility: CLINIC | Age: 41
End: 2021-01-25

## 2021-01-25 DIAGNOSIS — S86.011A PARTIAL TEAR OF RIGHT ACHILLES TENDON, INITIAL ENCOUNTER: Primary | ICD-10-CM

## 2021-01-25 NOTE — TELEPHONE ENCOUNTER
I did not mean for referral to go to Dr. Cho. Sorry, this Referral should go to Dr. Lam. I have spoke with him on this as well. I have replaced the referral. Thank you.

## 2021-01-25 NOTE — TELEPHONE ENCOUNTER
"Message from scheduling regarding referral:    \"PLEASE ADVISE. DR JACKMAN IS A PODIATRIST. THIS REFERRAL IS TO ORTHO, PLEASE ADVISE: THIS WILL NEED TO BE SENT TO PODIATRY REF 90 TO BE SCHEDULED! THANK YOU\"    They need the referral to be changed from ortho to podiatry.  "

## 2021-02-02 ENCOUNTER — OFFICE VISIT (OUTPATIENT)
Dept: ORTHOPEDIC SURGERY | Facility: CLINIC | Age: 41
End: 2021-02-02

## 2021-02-02 VITALS
HEART RATE: 78 BPM | HEIGHT: 74 IN | BODY MASS INDEX: 38.76 KG/M2 | WEIGHT: 302 LBS | DIASTOLIC BLOOD PRESSURE: 86 MMHG | SYSTOLIC BLOOD PRESSURE: 129 MMHG

## 2021-02-02 DIAGNOSIS — M79.604 RIGHT LEG PAIN: ICD-10-CM

## 2021-02-02 DIAGNOSIS — S86.111A GASTROCNEMIUS TEAR, RIGHT, INITIAL ENCOUNTER: ICD-10-CM

## 2021-02-02 DIAGNOSIS — M79.661 RIGHT CALF PAIN: Primary | ICD-10-CM

## 2021-02-02 PROCEDURE — 99203 OFFICE O/P NEW LOW 30 MIN: CPT | Performed by: FAMILY MEDICINE

## 2021-02-02 PROCEDURE — 76882 US LMTD JT/FCL EVL NVASC XTR: CPT | Performed by: FAMILY MEDICINE

## 2021-02-02 RX ORDER — MELOXICAM 15 MG/1
15 TABLET ORAL DAILY
Qty: 30 TABLET | Refills: 3 | Status: SHIPPED | OUTPATIENT
Start: 2021-02-02 | End: 2022-05-26

## 2021-02-02 NOTE — PROGRESS NOTES
"Primary Care Sports Medicine Office Visit Note     Patient ID: Lonnie Green is a 40 y.o. male.    Chief Complaint:  Chief Complaint   Patient presents with   • Right Lower Leg - Pain     HPI:    Mr. Lonnie Green is a 40 y.o. male who presents to the clinic today for follow up evaluation of calf injury in late October (near 4 months ago). He was on his \"tip toes\" reaching to get something off a of a high shelf at his home, when he felt a pop in his calf. \"Felt like someone hit me with a sledge hammer\". Went to urgent care near immediately after this injury. Was told possible sprain. Followed up with his PCP. Doppler US was ordered DVT was ruled out. Ultrasonographer did see muscle pathology though, so he was sent here.     Since the injury. Little to no pain. Was functional albe to preform 10 mile ruck march. Strength is intact, though plantar flexion his calf feels \"tight\".     Past Medical History:   Diagnosis Date   • GERD (gastroesophageal reflux disease)     Esophageal stricture   • Hypertension    • Incisional hernia    • Pneumonia    • Tachycardia    • Umbilical hernia        Past Surgical History:   Procedure Laterality Date   • CHOLECYSTECTOMY  06/26/2013   • INCISIONAL HERNIA REPAIR  02/02/2015   • TONSILLECTOMY  1990       Family History   Problem Relation Age of Onset   • Diabetes Mother    • Stroke Mother    • Hypertension Father    • Hypertension Other    • Cancer Other         Abstraction from [facility] - Lung Cancer     Social History     Occupational History   • Not on file   Tobacco Use   • Smoking status: Former Smoker   • Smokeless tobacco: Never Used   Substance and Sexual Activity   • Alcohol use: Yes     Frequency: Monthly or less   • Drug use: No   • Sexual activity: Not on file      Review of Systems   Constitutional: Negative for activity change and fever.   Respiratory: Negative for cough and shortness of breath.    Cardiovascular: Negative for chest pain.   Gastrointestinal: " "Negative for constipation, diarrhea, nausea and vomiting.   Musculoskeletal: Positive for arthralgias.   Skin: Negative for color change and rash.   Neurological: Negative for weakness.   Hematological: Does not bruise/bleed easily.     Objective:    /86   Pulse 78   Ht 188 cm (74\")   Wt (!) 137 kg (302 lb)   BMI 38.77 kg/m²     Physical Examination:  Physical Exam  Vitals signs and nursing note reviewed.   Constitutional:       General: He is not in acute distress.     Appearance: He is well-developed. He is not diaphoretic.   HENT:      Head: Normocephalic and atraumatic.   Eyes:      Conjunctiva/sclera: Conjunctivae normal.   Pulmonary:      Effort: Pulmonary effort is normal. No respiratory distress.   Skin:     General: Skin is warm.      Capillary Refill: Capillary refill takes less than 2 seconds.   Neurological:      Mental Status: He is alert.       Right Ankle Exam   Swelling: none    Range of Motion   Dorsiflexion: normal   Plantar flexion: normal   Eversion: normal   Inversion: normal     Muscle Strength   Anterior tibial:  5/5  Posterior tibial:  5/5  Gastrocsoleus:  5/5  Peroneal muscle:  5/5    Tests   Anterior drawer: negative  Varus tilt: negative    Other   Erythema: absent  Sensation: normal  Pulse: present     Comments:  Moderate tenderness to palpation and palpable mass to the medial aspect of the myotendinous junction of the Achilles tendon, in the medial calf.      Right Knee Exam     Tenderness   The patient is experiencing no tenderness.     Range of Motion   Extension: normal   Flexion: normal         Imaging and other tests:  Limited musculoskeletal diagnostic ultrasound:  Indication: Gastrocnemius evaluation, potential calf tear  Findings: 2D grayscale ultrasound was used to evaluate the integrity of gastrocnemius musculature and Achilles tendon.  Images from this evaluation were saved in the ultrasound machine.  The Achilles tendon exhibits normal architecture, without any " "edematous change, or loss of congruency from the level of the calcaneus, to the mid lower extremity.  There is no obvious or visual change to the Achilles tendon.  At the area of the myotendinous junction, there is a considerable, near 50% undersurface tear of the origin of the medial gastroc musculature.  There is moderate hypoechogenicity and edema in this area, with loss of muscle architecture.  Widest measured sagittal dimension of tear measures 0.73 cm.  Comparative data: None    Assessment and Plan:    1. Right leg pain  - XR tibia fibula 2 vw right  - Ambulatory Referral to Physical Therapy Evaluate and treat; Stretching, ROM, Strengthening    2. Right calf pain  - meloxicam (Mobic) 15 MG tablet; Take 1 tablet by mouth Daily.  Dispense: 30 tablet; Refill: 3  - Ambulatory Referral to Physical Therapy Evaluate and treat; Stretching, ROM, Strengthening    3. Gastrocnemius tear, right, initial encounter    I discussed with the patient his findings on ultrasound, and considerable sized tear of myotendinous junction.  Please see ultrasound machine for saved images.  He would like to attempt conservative management.  We discussed PRP, but he elected to forego this today.  He would like to start with physical therapy, and anti-inflammatory.  A prescription for meloxicam was sent in.  RTC in 4 to 6 weeks if no improvement.    Jerome PRESTON \"Chance\" Genaro CRUM DO, CAQSM  02/02/21  17:13 EST    Disclaimer: Please note that areas of this note were completed with computer voice recognition software.  Quite often unanticipated grammatical, syntax, homophones, and other interpretive errors are inadvertently transcribed by the computer software. Please excuse any errors that have escaped final proofreading.  "

## 2021-06-15 ENCOUNTER — OFFICE VISIT (OUTPATIENT)
Dept: FAMILY MEDICINE CLINIC | Facility: CLINIC | Age: 41
End: 2021-06-15

## 2021-06-15 VITALS
HEIGHT: 74 IN | WEIGHT: 303 LBS | OXYGEN SATURATION: 98 % | HEART RATE: 70 BPM | DIASTOLIC BLOOD PRESSURE: 84 MMHG | SYSTOLIC BLOOD PRESSURE: 132 MMHG | BODY MASS INDEX: 38.89 KG/M2
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
## 2021-06-15 PROCEDURE — 99212 OFFICE O/P EST SF 10 MIN: CPT | Performed by: NURSE PRACTITIONER

## 2021-06-15 RX ORDER — DOXYCYCLINE HYCLATE 100 MG
100 TABLET ORAL 2 TIMES DAILY
Qty: 28 TABLET | Refills: 0 | Status: SHIPPED | OUTPATIENT
Start: 2021-06-15 | End: 2021-06-29

## 2021-06-15 NOTE — PROGRESS NOTES
Chief Complaint  Insect Bite (tick on lt side of neck)    Subjective          Lonnie Green presents to [facility] for   Insect Bite  This is a new problem. The current episode started yesterday (pt removed tick yesterday am, believes to have been on <24 hours. ). The problem occurs constantly. The problem has been gradually worsening. Pertinent negatives include no arthralgias, chills, fever, rash, vertigo, vomiting or weakness.       The following portions of the patient's history were reviewed and updated as appropriate: allergies, current medications, past family history, past medical history, past social history, past surgical history and problem list.    Past Medical History:   Diagnosis Date   • GERD (gastroesophageal reflux disease)    • Hypertension    • Incisional hernia    • Pneumonia    • Tachycardia    • Umbilical hernia      Past Surgical History:   Procedure Laterality Date   • CHOLECYSTECTOMY  2013   • INCISIONAL HERNIA REPAIR  2015   • TONSILLECTOMY       Family History   Problem Relation Age of Onset   • Diabetes Mother    • Stroke Mother    • Hypertension Father    • Hypertension Other    • Cancer Other         Abstraction from [facility] - Lung Cancer     Social History     Tobacco Use   • Smoking status: Former Smoker     Packs/day: 1.00     Years: 5.00     Pack years: 5.00     Types: Cigarettes     Start date:      Quit date:      Years since quittin.4   • Smokeless tobacco: Never Used   Substance Use Topics   • Alcohol use: Yes       Current Outpatient Medications:   •  escitalopram (LEXAPRO) 10 MG tablet, Take 1 tablet by mouth Daily., Disp: 90 tablet, Rfl: 1  •  lisinopril (PRINIVIL,ZESTRIL) 10 MG tablet, Take 1 tablet by mouth Daily., Disp: 90 tablet, Rfl: 1  •  metoprolol succinate XL (TOPROL-XL) 50 MG 24 hr tablet, Take 1 tablet by mouth Daily., Disp: 90 tablet, Rfl: 1  •  pantoprazole (PROTONIX) 40 MG EC tablet, Take 1  "tablet by mouth 2 (two) times a day. Indications: Indigestion, Gastroesophageal Reflux Disease, Disp: 180 tablet, Rfl: 1  •  doxycycline (VIBRAMYICN) 100 MG tablet, Take 1 tablet by mouth 2 (Two) Times a Day for 14 days., Disp: 28 tablet, Rfl: 0  •  meloxicam (Mobic) 15 MG tablet, Take 1 tablet by mouth Daily., Disp: 30 tablet, Rfl: 3    Objective   Vital Signs:   /84 (BP Location: Left arm, Patient Position: Sitting, Cuff Size: Large Adult)   Pulse 70   Ht 188 cm (74.02\")   Wt (!) 137 kg (303 lb)   SpO2 98%   BMI 38.89 kg/m²       Physical Exam  Constitutional:       General: He is not in acute distress.     Appearance: He is normal weight. He is ill-appearing.   HENT:      Head: Normocephalic.   Musculoskeletal:         General: Normal range of motion.      Cervical back: Normal range of motion.   Skin:     General: Skin is warm.      Comments: Tick bite just above the left clavicular notch, erythemic, pruritic.  No apparent erythema migrans, entire tick has been removed   Neurological:      General: No focal deficit present.      Mental Status: He is alert and oriented to person, place, and time. Mental status is at baseline.   Psychiatric:         Mood and Affect: Mood normal.         Behavior: Behavior normal.         Thought Content: Thought content normal.         Judgment: Judgment normal.          Result Review :     No visits with results within 7 Day(s) from this visit.   Latest known visit with results is:   Lab on 01/08/2021   Component Date Value Ref Range Status   • Total Cholesterol 01/08/2021 216* 0 - 200 mg/dL Final   • Triglycerides 01/08/2021 179* 0 - 150 mg/dL Final   • HDL Cholesterol 01/08/2021 36* 40 - 60 mg/dL Final   • LDL Cholesterol  01/08/2021 147* 0 - 100 mg/dL Final   • VLDL Cholesterol 01/08/2021 33  5 - 40 mg/dL Final   • LDL/HDL Ratio 01/08/2021 4.01   Final   • Glucose 01/08/2021 90  65 - 99 mg/dL Final   • BUN 01/08/2021 15  6 - 20 mg/dL Final   • Creatinine 01/08/2021 " 0.70* 0.76 - 1.27 mg/dL Final   • Sodium 01/08/2021 142  136 - 145 mmol/L Final   • Potassium 01/08/2021 4.6  3.5 - 5.2 mmol/L Final   • Chloride 01/08/2021 104  98 - 107 mmol/L Final   • CO2 01/08/2021 27.2  22.0 - 29.0 mmol/L Final   • Calcium 01/08/2021 9.3  8.6 - 10.5 mg/dL Final   • Total Protein 01/08/2021 7.8  6.0 - 8.5 g/dL Final   • Albumin 01/08/2021 4.50  3.50 - 5.20 g/dL Final   • ALT (SGPT) 01/08/2021 23  1 - 41 U/L Final   • AST (SGOT) 01/08/2021 21  1 - 40 U/L Final   • Alkaline Phosphatase 01/08/2021 73  39 - 117 U/L Final   • Total Bilirubin 01/08/2021 0.4  0.0 - 1.2 mg/dL Final   • eGFR Non  Amer 01/08/2021 125  >60 mL/min/1.73 Final   • Globulin 01/08/2021 3.3  gm/dL Final   • A/G Ratio 01/08/2021 1.4  g/dL Final   • BUN/Creatinine Ratio 01/08/2021 21.4  7.0 - 25.0 Final   • Anion Gap 01/08/2021 10.8  5.0 - 15.0 mmol/L Final   • WBC 01/08/2021 8.40  3.40 - 10.80 10*3/mm3 Final   • RBC 01/08/2021 4.84  4.14 - 5.80 10*6/mm3 Final   • Hemoglobin 01/08/2021 14.6  13.0 - 17.7 g/dL Final   • Hematocrit 01/08/2021 41.9  37.5 - 51.0 % Final   • MCV 01/08/2021 86.6  79.0 - 97.0 fL Final   • MCH 01/08/2021 30.2  26.6 - 33.0 pg Final   • MCHC 01/08/2021 34.8  31.5 - 35.7 g/dL Final   • RDW 01/08/2021 12.4  12.3 - 15.4 % Final   • RDW-SD 01/08/2021 38.6  37.0 - 54.0 fl Final   • MPV 01/08/2021 9.8  6.0 - 12.0 fL Final   • Platelets 01/08/2021 356  140 - 450 10*3/mm3 Final   • TSH 01/08/2021 0.992  0.270 - 4.200 uIU/mL Final   • Hemoglobin A1C 01/08/2021 5.6  3.5 - 5.6 % Final                       Assessment and Plan    Diagnoses and all orders for this visit:    1. Tick bite, initial encounter (Primary)    Other orders  -     doxycycline (VIBRAMYICN) 100 MG tablet; Take 1 tablet by mouth 2 (Two) Times a Day for 14 days.  Dispense: 28 tablet; Refill: 0    Patient is a , has had many tick bites in the past.  He reports this 1 seems to be somewhat different, recommend prophylactically  we start doxycycline although there is apparent erythema migrans.  Okay to use hydrocortisone that he has at home for itching as needed.  Notify me for any change in signs and symptoms      I spent 17 minutes caring for Lonnie Green on this date of service. This time includes time spent by me in the following activities: preparing for the visit, reviewing tests, performing a medically appropriate examination and/or evaluation , counseling and educating the patient/family/caregiver, ordering medications, tests, or procedures and documenting information in the medical record        Follow Up     Return if symptoms worsen or fail to improve, for Next scheduled follow up.  Patient was given instructions and counseling regarding his condition or for health maintenance advice. Please see specific information pulled into the AVS if appropriate.      EMR Dragon transcription disclaimer:  Some of this encounter note is an electronic transcription translation of spoken language to printed text. The electronic translation of spoken language may permit erroneous, or at times, nonsensical words or phrases to be inadvertently transcribed; Although I have reviewed the note for such errors some may still exist.

## 2021-07-10 DIAGNOSIS — K21.00 GASTROESOPHAGEAL REFLUX DISEASE WITH ESOPHAGITIS WITHOUT HEMORRHAGE: ICD-10-CM

## 2021-07-12 RX ORDER — METOPROLOL SUCCINATE 50 MG/1
50 TABLET, EXTENDED RELEASE ORAL DAILY
Qty: 90 TABLET | Refills: 1 | Status: SHIPPED | OUTPATIENT
Start: 2021-07-12 | End: 2022-01-19

## 2021-07-12 RX ORDER — PANTOPRAZOLE SODIUM 40 MG/1
TABLET, DELAYED RELEASE ORAL
Qty: 180 TABLET | Refills: 1 | Status: SHIPPED | OUTPATIENT
Start: 2021-07-12 | End: 2022-01-19

## 2021-10-16 DIAGNOSIS — F41.9 ANXIETY: ICD-10-CM

## 2021-10-22 RX ORDER — ESCITALOPRAM OXALATE 10 MG/1
10 TABLET ORAL DAILY
Qty: 90 TABLET | Refills: 1 | Status: SHIPPED | OUTPATIENT
Start: 2021-10-22 | End: 2022-05-26 | Stop reason: SDUPTHER

## 2021-11-16 DIAGNOSIS — I10 ESSENTIAL HYPERTENSION: ICD-10-CM

## 2021-11-16 RX ORDER — LISINOPRIL 10 MG/1
10 TABLET ORAL DAILY
Qty: 90 TABLET | Refills: 1 | Status: SHIPPED | OUTPATIENT
Start: 2021-11-16 | End: 2022-05-26 | Stop reason: SDUPTHER

## 2022-01-18 DIAGNOSIS — K21.00 GASTROESOPHAGEAL REFLUX DISEASE WITH ESOPHAGITIS WITHOUT HEMORRHAGE: ICD-10-CM

## 2022-01-19 RX ORDER — METOPROLOL SUCCINATE 50 MG/1
50 TABLET, EXTENDED RELEASE ORAL DAILY
Qty: 90 TABLET | Refills: 1 | Status: SHIPPED | OUTPATIENT
Start: 2022-01-19 | End: 2022-05-02

## 2022-01-19 RX ORDER — PANTOPRAZOLE SODIUM 40 MG/1
TABLET, DELAYED RELEASE ORAL
Qty: 180 TABLET | Refills: 1 | Status: SHIPPED | OUTPATIENT
Start: 2022-01-19 | End: 2022-05-02

## 2022-04-30 DIAGNOSIS — K21.00 GASTROESOPHAGEAL REFLUX DISEASE WITH ESOPHAGITIS WITHOUT HEMORRHAGE: ICD-10-CM

## 2022-05-02 RX ORDER — PANTOPRAZOLE SODIUM 40 MG/1
TABLET, DELAYED RELEASE ORAL
Qty: 180 TABLET | Refills: 0 | Status: SHIPPED | OUTPATIENT
Start: 2022-05-02 | End: 2022-12-06

## 2022-05-02 RX ORDER — METOPROLOL SUCCINATE 50 MG/1
50 TABLET, EXTENDED RELEASE ORAL DAILY
Qty: 90 TABLET | Refills: 0 | Status: SHIPPED | OUTPATIENT
Start: 2022-05-02 | End: 2022-10-24

## 2022-05-02 NOTE — TELEPHONE ENCOUNTER
Rx Refill Note  Requested Prescriptions     Pending Prescriptions Disp Refills   • metoprolol succinate XL (TOPROL-XL) 50 MG 24 hr tablet [Pharmacy Med Name: METOPROLOL ER SUCCINATE 50MG TABS] 90 tablet 1     Sig: TAKE 1 TABLET BY MOUTH DAILY   • pantoprazole (PROTONIX) 40 MG EC tablet [Pharmacy Med Name: PANTOPRAZOLE 40MG TABLETS] 180 tablet 1     Sig: TAKE 1 TABLET BY MOUTH TWICE DAILY      Last office visit with prescribing clinician: 6/15/2021      Next office visit with prescribing clinician: Visit date not found            Bina Hester MA  05/02/22, 13:26 EDT

## 2022-05-26 ENCOUNTER — OFFICE VISIT (OUTPATIENT)
Dept: FAMILY MEDICINE CLINIC | Facility: CLINIC | Age: 42
End: 2022-05-26

## 2022-05-26 VITALS
SYSTOLIC BLOOD PRESSURE: 146 MMHG | HEART RATE: 74 BPM | HEIGHT: 74 IN | BODY MASS INDEX: 39.53 KG/M2 | DIASTOLIC BLOOD PRESSURE: 88 MMHG | OXYGEN SATURATION: 97 % | WEIGHT: 308 LBS

## 2022-05-26 DIAGNOSIS — F41.9 ANXIETY: ICD-10-CM

## 2022-05-26 DIAGNOSIS — K21.00 GASTROESOPHAGEAL REFLUX DISEASE WITH ESOPHAGITIS WITHOUT HEMORRHAGE: ICD-10-CM

## 2022-05-26 DIAGNOSIS — I10 ESSENTIAL HYPERTENSION: ICD-10-CM

## 2022-05-26 DIAGNOSIS — Z86.010 HISTORY OF COLON POLYPS: ICD-10-CM

## 2022-05-26 DIAGNOSIS — E78.2 MIXED HYPERLIPIDEMIA: ICD-10-CM

## 2022-05-26 DIAGNOSIS — Z00.00 PREVENTATIVE HEALTH CARE: Primary | ICD-10-CM

## 2022-05-26 LAB
DEPRECATED RDW RBC AUTO: 42 FL (ref 37–54)
ERYTHROCYTE [DISTWIDTH] IN BLOOD BY AUTOMATED COUNT: 13.6 % (ref 12.3–15.4)
HCT VFR BLD AUTO: 42.7 % (ref 37.5–51)
HGB BLD-MCNC: 14.1 G/DL (ref 13–17.7)
MCH RBC QN AUTO: 29.2 PG (ref 26.6–33)
MCHC RBC AUTO-ENTMCNC: 33.2 G/DL (ref 31.5–35.7)
MCV RBC AUTO: 87.9 FL (ref 79–97)
PLATELET # BLD AUTO: 343 10*3/MM3 (ref 140–450)
PMV BLD AUTO: 8 FL (ref 6–12)
RBC # BLD AUTO: 4.85 10*6/MM3 (ref 4.14–5.8)
WBC NRBC COR # BLD: 7.7 10*3/MM3 (ref 3.4–10.8)

## 2022-05-26 PROCEDURE — 36415 COLL VENOUS BLD VENIPUNCTURE: CPT | Performed by: NURSE PRACTITIONER

## 2022-05-26 PROCEDURE — 80050 GENERAL HEALTH PANEL: CPT | Performed by: NURSE PRACTITIONER

## 2022-05-26 PROCEDURE — 99396 PREV VISIT EST AGE 40-64: CPT | Performed by: NURSE PRACTITIONER

## 2022-05-26 PROCEDURE — 86803 HEPATITIS C AB TEST: CPT | Performed by: NURSE PRACTITIONER

## 2022-05-26 PROCEDURE — 80061 LIPID PANEL: CPT | Performed by: NURSE PRACTITIONER

## 2022-05-26 RX ORDER — ESCITALOPRAM OXALATE 10 MG/1
10 TABLET ORAL DAILY
Qty: 90 TABLET | Refills: 1 | Status: SHIPPED | OUTPATIENT
Start: 2022-05-26 | End: 2022-11-28

## 2022-05-26 RX ORDER — LISINOPRIL 10 MG/1
10 TABLET ORAL DAILY
Qty: 90 TABLET | Refills: 1 | Status: SHIPPED | OUTPATIENT
Start: 2022-05-26 | End: 2022-11-28

## 2022-05-26 NOTE — PROGRESS NOTES
Chief Complaint  Chief Complaint   Patient presents with   • Annual Exam           Subjective          Lonnie Green presents to [facility] for   History of Present Illness     Pt presents today for annual exam.  Has no complaints today    HTN, runs 125-135/80-88, stable on meds and takes as directed, denies chest pain, headache, shortness of air, palpitations and swelling of extremities.     Hyperlipidemia, not on statin, denies constipation, diarrhea, GI upset, fatigue, chest pain/pressure, exercise intolerance, dyspnea, palpitations, syncope and pedal edema.      Anxiety, improved/stable on Lexapro.  Patient denies significant weight loss/gain, insomnia, hypersomnia, psychomotor agitation, psychomotor retardation, fatigue (loss of energy), feelings of worthlessness (guilt), impaired concentration (indecisiveness), thoughts of death or suicide.       GERD, stable on medication, denies nausea, vomiting, constipation, abdominal pain and diarrhea.      The following portions of the patient's history were reviewed and updated as appropriate: allergies, current medications, past family history, past medical history, past social history, past surgical history and problem list.    Past Medical History:   Diagnosis Date   • GERD (gastroesophageal reflux disease)    • Hypertension    • Incisional hernia    • Pneumonia    • Tachycardia    • Umbilical hernia      Past Surgical History:   Procedure Laterality Date   • CHOLECYSTECTOMY  06/26/2013   • INCISIONAL HERNIA REPAIR  02/02/2015   • TONSILLECTOMY  1990     Family History   Problem Relation Age of Onset   • Diabetes Mother    • Stroke Mother    • Hypertension Father    • Hypertension Other    • Cancer Other         [facility]raction from [facility]     Social History     Tobacco Use   • Smoking status: Former Smoker     Packs/day: 1.00     Years: 5.00     Pack years: 5.00     Types: Cigarettes     Start date: 2000     Quit  "date:      Years since quittin.4   • Smokeless tobacco: Never Used   Substance Use Topics   • Alcohol use: Yes       Current Outpatient Medications:   •  escitalopram (LEXAPRO) 10 MG tablet, Take 1 tablet by mouth Daily., Disp: 90 tablet, Rfl: 1  •  lisinopril (PRINIVIL,ZESTRIL) 10 MG tablet, Take 1 tablet by mouth Daily., Disp: 90 tablet, Rfl: 1  •  metoprolol succinate XL (TOPROL-XL) 50 MG 24 hr tablet, TAKE 1 TABLET BY MOUTH DAILY, Disp: 90 tablet, Rfl: 0  •  pantoprazole (PROTONIX) 40 MG EC tablet, TAKE 1 TABLET BY MOUTH TWICE DAILY, Disp: 180 tablet, Rfl: 0    Objective   Vital Signs:   /88 (BP Location: Left arm, Patient Position: Sitting, Cuff Size: Large Adult)   Pulse 74   Ht 188 cm (74.02\")   Wt (!) 140 kg (308 lb)   SpO2 97%   BMI 39.53 kg/m²           Physical Exam  Vitals and nursing note reviewed.   Constitutional:       General: He is not in acute distress.     Appearance: He is well-developed. He is not diaphoretic.   HENT:      Head: Normocephalic and atraumatic.   Eyes:      Pupils: Pupils are equal, round, and reactive to light.   Neck:      Thyroid: No thyromegaly.   Cardiovascular:      Rate and Rhythm: Normal rate and regular rhythm.      Heart sounds: Normal heart sounds. No murmur heard.  Pulmonary:      Effort: Pulmonary effort is normal. No respiratory distress.      Breath sounds: Normal breath sounds.   Abdominal:      General: Bowel sounds are normal. There is no distension.      Palpations: Abdomen is soft.      Tenderness: There is no abdominal tenderness.   Musculoskeletal:         General: Normal range of motion.      Cervical back: Normal range of motion.   Skin:     General: Skin is warm and dry.      Findings: No erythema.   Neurological:      Mental Status: He is alert and oriented to person, place, and time.   Psychiatric:         Behavior: Behavior normal.         Thought Content: Thought content normal.         Judgment: Judgment normal.          Result " Review :     Office Visit on 05/26/2022   Component Date Value Ref Range Status   • Hepatitis C Ab 05/26/2022 Non-Reactive  Non-Reactive Final   • Total Cholesterol 05/26/2022 215 (A) 0 - 200 mg/dL Final   • Triglycerides 05/26/2022 348 (A) 0 - 150 mg/dL Final   • HDL Cholesterol 05/26/2022 35 (A) 40 - 60 mg/dL Final   • LDL Cholesterol  05/26/2022 119 (A) 0 - 100 mg/dL Final   • VLDL Cholesterol 05/26/2022 61 (A) 5 - 40 mg/dL Final   • LDL/HDL Ratio 05/26/2022 3.15   Final   • Glucose 05/26/2022 92  65 - 99 mg/dL Final   • BUN 05/26/2022 11  6 - 20 mg/dL Final   • Creatinine 05/26/2022 0.86  0.76 - 1.27 mg/dL Final   • Sodium 05/26/2022 142  136 - 145 mmol/L Final   • Potassium 05/26/2022 3.9  3.5 - 5.2 mmol/L Final   • Chloride 05/26/2022 105  98 - 107 mmol/L Final   • CO2 05/26/2022 23.8  22.0 - 29.0 mmol/L Final   • Calcium 05/26/2022 9.6  8.6 - 10.5 mg/dL Final   • Total Protein 05/26/2022 7.5  6.0 - 8.5 g/dL Final   • Albumin 05/26/2022 4.30  3.50 - 5.20 g/dL Final   • ALT (SGPT) 05/26/2022 29  1 - 41 U/L Final   • AST (SGOT) 05/26/2022 22  1 - 40 U/L Final   • Alkaline Phosphatase 05/26/2022 69  39 - 117 U/L Final   • Total Bilirubin 05/26/2022 0.4  0.0 - 1.2 mg/dL Final   • Globulin 05/26/2022 3.2  gm/dL Final   • A/G Ratio 05/26/2022 1.3  g/dL Final   • BUN/Creatinine Ratio 05/26/2022 12.8  7.0 - 25.0 Final   • Anion Gap 05/26/2022 13.2  5.0 - 15.0 mmol/L Final   • eGFR 05/26/2022 111.6  >60.0 mL/min/1.73 Final    National Kidney Foundation and American Society of Nephrology (ASN) Task Force recommended calculation based on the Chronic Kidney Disease Epidemiology Collaboration (CKD-EPI) equation refit without adjustment for race.   • WBC 05/26/2022 7.70  3.40 - 10.80 10*3/mm3 Final   • RBC 05/26/2022 4.85  4.14 - 5.80 10*6/mm3 Final   • Hemoglobin 05/26/2022 14.1  13.0 - 17.7 g/dL Final   • Hematocrit 05/26/2022 42.7  37.5 - 51.0 % Final   • MCV 05/26/2022 87.9  79.0 - 97.0 fL Final   • MCH 05/26/2022 29.2   26.6 - 33.0 pg Final   • MCHC 05/26/2022 33.2  31.5 - 35.7 g/dL Final   • RDW 05/26/2022 13.6  12.3 - 15.4 % Final   • RDW-SD 05/26/2022 42.0  37.0 - 54.0 fl Final   • MPV 05/26/2022 8.0  6.0 - 12.0 fL Final   • Platelets 05/26/2022 343  140 - 450 10*3/mm3 Final   • TSH 05/26/2022 0.954  0.270 - 4.200 uIU/mL Final                  Class 2 Severe Obesity (BMI >=35 and <=39.9). Obesity-related health conditions include the following: hypertension and dyslipidemias. Obesity is unchanged. BMI is is above average; BMI management plan is completed. We discussed low calorie, low carb based diet program, portion control and increasing exercise.           Assessment and Plan    Diagnoses and all orders for this visit:    1. Preventative health care (Primary)  Comments:  Labs today as ordered, patient ate 3 hours ago.  recommend lifestyle modifications, healthy heart diet exercise and weight loss  Colonoscopy due July 2022  Orders:  -     Hepatitis C Antibody    2. Gastroesophageal reflux disease with esophagitis without hemorrhage  Comments:  Stable, continue pantoprazole most days only using once daily.  No refill needed at this time.    3. Essential hypertension  Comments:  BP slightly elevated, stable outside of the office 120s to 130s/80s.  Continue/refill lisinopril and patient will call when metoprolol refill needed  Orders:  -     lisinopril (PRINIVIL,ZESTRIL) 10 MG tablet; Take 1 tablet by mouth Daily.  Dispense: 90 tablet; Refill: 1  -     Comprehensive Metabolic Panel  -     CBC (No Diff)  -     TSH    4. Anxiety  Comments:  Stable on Lexapro, continue and refill  Orders:  -     escitalopram (LEXAPRO) 10 MG tablet; Take 1 tablet by mouth Daily.  Dispense: 90 tablet; Refill: 1    5. Mixed hyperlipidemia  Comments:  Previously improved but slightly elevated with LDL/HDL ratio of 4, lipids today and will notify results.  Consider statin if remains elevated  Orders:  -     Lipid Panel    6. History of colon  polyps  Comments:  July 2019 w/ removal of x3 polyps and internal hemorrhoids noted. rec pt to call and schedule 3 year recall colonoscopy          -Age appropriate preventative counseling provided, including healthy lifestyle modifications and exercise      I spent 30 minutes caring for Lonnie Green on this date of service. This time includes time spent by me in the following activities: preparing for the visit, reviewing tests, performing a medically appropriate examination and/or evaluation , counseling and educating the patient/family/caregiver, ordering medications, tests, or procedures and documenting information in the medical record        Follow Up     Return in about 1 year (around 5/26/2023), or if symptoms worsen or fail to improve, for Recheck.  Patient was given instructions and counseling regarding his condition or for health maintenance advice. Please see specific information pulled into the AVS if appropriate.        Part of this note may be an electronic transcription/translation of spoken language to printed text using the Dragon Dictation System

## 2022-05-26 NOTE — PROGRESS NOTES
Venipuncture Blood Specimen Collection  Venipuncture performed in rt arm by Bina Hester MA with good hemostasis. Patient tolerated the procedure well without complications.  Pt wasn't fasting.   05/26/22   Bina Hester MA

## 2022-05-27 LAB
ALBUMIN SERPL-MCNC: 4.3 G/DL (ref 3.5–5.2)
ALBUMIN/GLOB SERPL: 1.3 G/DL
ALP SERPL-CCNC: 69 U/L (ref 39–117)
ALT SERPL W P-5'-P-CCNC: 29 U/L (ref 1–41)
ANION GAP SERPL CALCULATED.3IONS-SCNC: 13.2 MMOL/L (ref 5–15)
AST SERPL-CCNC: 22 U/L (ref 1–40)
BILIRUB SERPL-MCNC: 0.4 MG/DL (ref 0–1.2)
BUN SERPL-MCNC: 11 MG/DL (ref 6–20)
BUN/CREAT SERPL: 12.8 (ref 7–25)
CALCIUM SPEC-SCNC: 9.6 MG/DL (ref 8.6–10.5)
CHLORIDE SERPL-SCNC: 105 MMOL/L (ref 98–107)
CHOLEST SERPL-MCNC: 215 MG/DL (ref 0–200)
CO2 SERPL-SCNC: 23.8 MMOL/L (ref 22–29)
CREAT SERPL-MCNC: 0.86 MG/DL (ref 0.76–1.27)
EGFRCR SERPLBLD CKD-EPI 2021: 111.6 ML/MIN/1.73
GLOBULIN UR ELPH-MCNC: 3.2 GM/DL
GLUCOSE SERPL-MCNC: 92 MG/DL (ref 65–99)
HCV AB SER DONR QL: NORMAL
HDLC SERPL-MCNC: 35 MG/DL (ref 40–60)
LDLC SERPL CALC-MCNC: 119 MG/DL (ref 0–100)
LDLC/HDLC SERPL: 3.15 {RATIO}
POTASSIUM SERPL-SCNC: 3.9 MMOL/L (ref 3.5–5.2)
PROT SERPL-MCNC: 7.5 G/DL (ref 6–8.5)
SODIUM SERPL-SCNC: 142 MMOL/L (ref 136–145)
TRIGL SERPL-MCNC: 348 MG/DL (ref 0–150)
TSH SERPL DL<=0.05 MIU/L-ACNC: 0.95 UIU/ML (ref 0.27–4.2)
VLDLC SERPL-MCNC: 61 MG/DL (ref 5–40)

## 2022-10-24 RX ORDER — METOPROLOL SUCCINATE 50 MG/1
50 TABLET, EXTENDED RELEASE ORAL DAILY
Qty: 90 TABLET | Refills: 0 | Status: SHIPPED | OUTPATIENT
Start: 2022-10-24 | End: 2022-11-28

## 2022-11-28 DIAGNOSIS — F41.9 ANXIETY: ICD-10-CM

## 2022-11-28 DIAGNOSIS — I10 ESSENTIAL HYPERTENSION: ICD-10-CM

## 2022-11-28 RX ORDER — ESCITALOPRAM OXALATE 10 MG/1
10 TABLET ORAL DAILY
Qty: 90 TABLET | Refills: 1 | Status: SHIPPED | OUTPATIENT
Start: 2022-11-28 | End: 2023-01-30 | Stop reason: SDUPTHER

## 2022-11-28 RX ORDER — METOPROLOL SUCCINATE 50 MG/1
50 TABLET, EXTENDED RELEASE ORAL DAILY
Qty: 90 TABLET | Refills: 0 | Status: SHIPPED | OUTPATIENT
Start: 2022-11-28 | End: 2023-01-30 | Stop reason: SDUPTHER

## 2022-11-28 RX ORDER — LISINOPRIL 10 MG/1
10 TABLET ORAL DAILY
Qty: 90 TABLET | Refills: 1 | Status: SHIPPED | OUTPATIENT
Start: 2022-11-28 | End: 2023-01-30 | Stop reason: SDUPTHER

## 2022-12-06 DIAGNOSIS — K21.00 GASTROESOPHAGEAL REFLUX DISEASE WITH ESOPHAGITIS WITHOUT HEMORRHAGE: ICD-10-CM

## 2022-12-06 RX ORDER — PANTOPRAZOLE SODIUM 40 MG/1
TABLET, DELAYED RELEASE ORAL
Qty: 180 TABLET | Refills: 3 | Status: SHIPPED | OUTPATIENT
Start: 2022-12-06

## 2022-12-08 ENCOUNTER — TELEPHONE (OUTPATIENT)
Dept: FAMILY MEDICINE CLINIC | Facility: CLINIC | Age: 42
End: 2022-12-08

## 2022-12-08 NOTE — TELEPHONE ENCOUNTER
Submitted PA to insurance company and medication was approved. Pt was notified and expressed understanding. Advised that he should be able to pick medication up and if he has issues to let us know.

## 2022-12-08 NOTE — TELEPHONE ENCOUNTER
Caller: Lonnie Green    Relationship to patient: Self    Best call back number: [phone]    Patient is needing: PRIOR AUTHORIZATION IS NEEDED FOR THE PATIENTS pantoprazole (PROTONIX) 40 MG EC tablet PLEASE SEND.

## 2023-01-30 ENCOUNTER — TELEPHONE (OUTPATIENT)
Dept: FAMILY MEDICINE CLINIC | Facility: CLINIC | Age: 43
End: 2023-01-30
Payer: COMMERCIAL

## 2023-01-30 DIAGNOSIS — F41.9 ANXIETY: ICD-10-CM

## 2023-01-30 DIAGNOSIS — I10 ESSENTIAL HYPERTENSION: ICD-10-CM

## 2023-01-30 RX ORDER — ESCITALOPRAM OXALATE 10 MG/1
10 TABLET ORAL DAILY
Qty: 90 TABLET | Refills: 1 | Status: SHIPPED | OUTPATIENT
Start: 2023-01-30

## 2023-01-30 RX ORDER — METOPROLOL SUCCINATE 50 MG/1
50 TABLET, EXTENDED RELEASE ORAL DAILY
Qty: 90 TABLET | Refills: 1 | Status: SHIPPED | OUTPATIENT
Start: 2023-01-30

## 2023-01-30 RX ORDER — LISINOPRIL 10 MG/1
10 TABLET ORAL DAILY
Qty: 90 TABLET | Refills: 1 | Status: SHIPPED | OUTPATIENT
Start: 2023-01-30

## 2023-01-30 NOTE — TELEPHONE ENCOUNTER
Caller: Lonnie Green DEVEN    Relationship: Self    Best call back number: [phone]    Requested Prescriptions:   Requested Prescriptions     Pending Prescriptions Disp Refills   • metoprolol succinate XL (TOPROL-XL) 50 MG 24 hr tablet 90 tablet 0     Sig: Take 1 tablet by mouth Daily.   • lisinopril (PRINIVIL,ZESTRIL) 10 MG tablet 90 tablet 1     Sig: Take 1 tablet by mouth Daily.   • escitalopram (LEXAPRO) 10 MG tablet 90 tablet 1     Sig: Take 1 tablet by mouth Daily.        Pharmacy where request should be sent: [id number] [place] [address] AT [place] [address] - [phone] [facility] [phone] FX     Additional details provided by patient:     Does the patient have less than a 3 day supply:  [x] Yes  [] No        Marycarmen Daniels Rep   01/30/23 10:19 EST

## 2023-05-30 ENCOUNTER — OFFICE VISIT (OUTPATIENT)
Dept: FAMILY MEDICINE CLINIC | Facility: CLINIC | Age: 43
End: 2023-05-30

## 2023-05-30 VITALS
HEART RATE: 88 BPM | BODY MASS INDEX: 39.68 KG/M2 | WEIGHT: 309.2 LBS | OXYGEN SATURATION: 95 % | DIASTOLIC BLOOD PRESSURE: 96 MMHG | HEIGHT: 74 IN | SYSTOLIC BLOOD PRESSURE: 142 MMHG

## 2023-05-30 DIAGNOSIS — F41.9 ANXIETY: ICD-10-CM

## 2023-05-30 DIAGNOSIS — L91.8 ACQUIRED SKIN TAG: ICD-10-CM

## 2023-05-30 DIAGNOSIS — K21.00 GASTROESOPHAGEAL REFLUX DISEASE WITH ESOPHAGITIS WITHOUT HEMORRHAGE: ICD-10-CM

## 2023-05-30 DIAGNOSIS — I10 ESSENTIAL HYPERTENSION: Primary | ICD-10-CM

## 2023-05-30 DIAGNOSIS — E78.2 MIXED HYPERLIPIDEMIA: ICD-10-CM

## 2023-05-30 DIAGNOSIS — Z00.00 PREVENTATIVE HEALTH CARE: ICD-10-CM

## 2023-05-30 DIAGNOSIS — M62.830 BACK MUSCLE SPASM: ICD-10-CM

## 2023-05-30 DIAGNOSIS — M54.50 ACUTE MIDLINE LOW BACK PAIN WITHOUT SCIATICA: ICD-10-CM

## 2023-05-30 DIAGNOSIS — Z12.5 PROSTATE CANCER SCREENING: ICD-10-CM

## 2023-05-30 RX ORDER — BACLOFEN 10 MG/1
10 TABLET ORAL 3 TIMES DAILY PRN
Qty: 30 TABLET | Refills: 0 | Status: SHIPPED | OUTPATIENT
Start: 2023-05-30

## 2023-05-30 RX ORDER — PREDNISONE 10 MG/1
TABLET ORAL
Qty: 33 TABLET | Refills: 0 | Status: SHIPPED | OUTPATIENT
Start: 2023-05-30

## 2023-05-30 RX ORDER — IBUPROFEN 800 MG/1
800 TABLET ORAL EVERY 8 HOURS PRN
Qty: 90 TABLET | Refills: 1 | Status: SHIPPED | OUTPATIENT
Start: 2023-05-30

## 2023-05-30 NOTE — PROGRESS NOTES
Chief Complaint  Chief Complaint   Patient presents with   • Annual Exam   • Back Pain     Pt has been having back pain for about 4 weeks. Pt says he was weed eating and he slipped and turned very quickly. Pt says it was sore the next day and has been hurting since. Pain is progressively getting worse. Pt says it hurts to sit or stand. Pt has used ibuprofen but does not notices difference            Subjective          Lonnie Green presents to [facility] for   History of Present Illness    Pt presents today for annual exam. He c/o left sided low back pain, slipped weed eating 4 weeks ago, initially felt a quick sharp pain that resolved, then over the next week, pain gradually worsened to severe discomfort today. He reports the pain is a dull, constant ache, mostly left lateral to the spine, with cramping/spasms, unable to stand up straight, walking with limp, he has tried alternating ice/heat and ibuprofen 600 mg prn without relief    HTN, elevated today, he is in pain, reports bp runs 125-135/80-88, he feels stable on meds and takes as directed, denies chest pain, headache, shortness of air, palpitations and swelling of extremities.      Hyperlipidemia, lipids last year were not fasting, his not on a statin and did not start fish oil, denies constipation, diarrhea, GI upset, fatigue, chest pain/pressure, exercise intolerance, dyspnea, palpitations, syncope and pedal edema.       Anxiety, stable on Lexapro.  Patient denies significant weight loss/gain, insomnia, hypersomnia, psychomotor agitation, psychomotor retardation, fatigue (loss of energy), feelings of worthlessness (guilt), impaired concentration (indecisiveness), thoughts of death or suicide.       GERD, stable on medication, denies nausea, vomiting, constipation, abdominal pain and diarrhea.      The following portions of the patient's history were reviewed and updated as appropriate: allergies, current medications, past  family history, past medical history, past social history, past surgical history and problem list.    Past Medical History:   Diagnosis Date   • Anxiety    • GERD (gastroesophageal reflux disease)    • Hypertension    • Incisional hernia    • Pneumonia    • Tachycardia    • Umbilical hernia      Past Surgical History:   Procedure Laterality Date   • CHOLECYSTECTOMY  2013   • INCISIONAL HERNIA REPAIR  2015   • TONSILLECTOMY  1990     Family History   Problem Relation Age of Onset   • Diabetes Mother    • Stroke Mother    • Hypertension Father    • Hypertension Other    • Cancer Other         Abstraction from [facility]     Social History     Tobacco Use   • Smoking status: Former     Packs/day: 1.00     Years: 15.00     Pack years: 15.00     Types: Cigarettes     Start date: 2000     Quit date: 2005     Years since quittin.4   • Smokeless tobacco: Never   Substance Use Topics   • Alcohol use: Yes       Current Outpatient Medications:   •  escitalopram (LEXAPRO) 10 MG tablet, Take 1 tablet by mouth Daily., Disp: 90 tablet, Rfl: 1  •  lisinopril (PRINIVIL,ZESTRIL) 10 MG tablet, Take 1 tablet by mouth Daily., Disp: 90 tablet, Rfl: 1  •  metoprolol succinate XL (TOPROL-XL) 50 MG 24 hr tablet, Take 1 tablet by mouth Daily., Disp: 90 tablet, Rfl: 1  •  pantoprazole (PROTONIX) 40 MG EC tablet, TAKE 1 TABLET BY MOUTH TWICE DAILY, Disp: 180 tablet, Rfl: 3  •  baclofen (LIORESAL) 10 MG tablet, Take 1 tablet by mouth 3 (Three) Times a Day As Needed for Muscle Spasms., Disp: 30 tablet, Rfl: 0  •  ibuprofen (ADVIL,MOTRIN) 800 MG tablet, Take 1 tablet by mouth Every 8 (Eight) Hours As Needed for Moderate Pain., Disp: 90 tablet, Rfl: 1  •  predniSONE (DELTASONE) 10 MG tablet, Take 5 po for 2 days, Take 4 for 2 days, then 3 for 2 days, then 2 for 2 days, then 1 for 5 days, then stop, Disp: 33 tablet, Rfl: 0    Objective   Vital Signs:   /96 (BP Location: Left arm, Patient Position:  "Sitting, Cuff Size: Large Adult)   Pulse 88   Ht 188 cm (74.02\")   Wt (!) 140 kg (309 lb 3.2 oz)   SpO2 95%   BMI 39.68 kg/m²           Physical Exam  Vitals and nursing note reviewed.   Constitutional:       General: He is not in acute distress.     Appearance: He is well-developed. He is not diaphoretic.   HENT:      Head: Normocephalic and atraumatic.   Eyes:      Pupils: Pupils are equal, round, and reactive to light.   Neck:      Thyroid: No thyromegaly.   Cardiovascular:      Rate and Rhythm: Normal rate and regular rhythm.      Heart sounds: Normal heart sounds. No murmur heard.  Pulmonary:      Effort: Pulmonary effort is normal. No respiratory distress.      Breath sounds: Normal breath sounds.   Abdominal:      General: Bowel sounds are normal. There is no distension.      Palpations: Abdomen is soft.      Tenderness: There is no abdominal tenderness.   Musculoskeletal:         General: Tenderness (L sided low back ttp, L paraspinal tension and ttp, severe roberson rom, ambulates leaned to the left, unable to fully stand up straight, mod/severe roberson rom.  ) present. Normal range of motion.      Cervical back: Normal range of motion.   Skin:     General: Skin is warm and dry.      Findings: Lesion (scattered skin tags, neck and axillary) present. No erythema.   Neurological:      Mental Status: He is alert and oriented to person, place, and time.   Psychiatric:         Behavior: Behavior normal.         Thought Content: Thought content normal.         Judgment: Judgment normal.          Result Review :     No visits with results within 7 Day(s) from this visit.   Latest known visit with results is:   Office Visit on 05/26/2022   Component Date Value Ref Range Status   • Hepatitis C Ab 05/26/2022 Non-Reactive  Non-Reactive Final   • Total Cholesterol 05/26/2022 215 (H)  0 - 200 mg/dL Final   • Triglycerides 05/26/2022 348 (H)  0 - 150 mg/dL Final   • HDL Cholesterol 05/26/2022 35 (L)  40 - 60 mg/dL Final   • " LDL Cholesterol  05/26/2022 119 (H)  0 - 100 mg/dL Final   • VLDL Cholesterol 05/26/2022 61 (H)  5 - 40 mg/dL Final   • LDL/HDL Ratio 05/26/2022 3.15   Final   • Glucose 05/26/2022 92  65 - 99 mg/dL Final   • BUN 05/26/2022 11  6 - 20 mg/dL Final   • Creatinine 05/26/2022 0.86  0.76 - 1.27 mg/dL Final   • Sodium 05/26/2022 142  136 - 145 mmol/L Final   • Potassium 05/26/2022 3.9  3.5 - 5.2 mmol/L Final   • Chloride 05/26/2022 105  98 - 107 mmol/L Final   • CO2 05/26/2022 23.8  22.0 - 29.0 mmol/L Final   • Calcium 05/26/2022 9.6  8.6 - 10.5 mg/dL Final   • Total Protein 05/26/2022 7.5  6.0 - 8.5 g/dL Final   • Albumin 05/26/2022 4.30  3.50 - 5.20 g/dL Final   • ALT (SGPT) 05/26/2022 29  1 - 41 U/L Final   • AST (SGOT) 05/26/2022 22  1 - 40 U/L Final   • Alkaline Phosphatase 05/26/2022 69  39 - 117 U/L Final   • Total Bilirubin 05/26/2022 0.4  0.0 - 1.2 mg/dL Final   • Globulin 05/26/2022 3.2  gm/dL Final   • A/G Ratio 05/26/2022 1.3  g/dL Final   • BUN/Creatinine Ratio 05/26/2022 12.8  7.0 - 25.0 Final   • Anion Gap 05/26/2022 13.2  5.0 - 15.0 mmol/L Final   • eGFR 05/26/2022 111.6  >60.0 mL/min/1.73 Final    National Kidney Foundation and American Society of Nephrology (ASN) Task Force recommended calculation based on the Chronic Kidney Disease Epidemiology Collaboration (CKD-EPI) equation refit without adjustment for race.   • WBC 05/26/2022 7.70  3.40 - 10.80 10*3/mm3 Final   • RBC 05/26/2022 4.85  4.14 - 5.80 10*6/mm3 Final   • Hemoglobin 05/26/2022 14.1  13.0 - 17.7 g/dL Final   • Hematocrit 05/26/2022 42.7  37.5 - 51.0 % Final   • MCV 05/26/2022 87.9  79.0 - 97.0 fL Final   • MCH 05/26/2022 29.2  26.6 - 33.0 pg Final   • MCHC 05/26/2022 33.2  31.5 - 35.7 g/dL Final   • RDW 05/26/2022 13.6  12.3 - 15.4 % Final   • RDW-SD 05/26/2022 42.0  37.0 - 54.0 fl Final   • MPV 05/26/2022 8.0  6.0 - 12.0 fL Final   • Platelets 05/26/2022 343  140 - 450 10*3/mm3 Final   • TSH 05/26/2022 0.954  0.270 - 4.200 uIU/mL Final                   Class 2 Severe Obesity (BMI >=35 and <=39.9). Obesity-related health conditions include the following: hypertension and dyslipidemias. Obesity is unchanged. BMI is is above average; BMI management plan is completed. We discussed portion control and increasing exercise.           Assessment and Plan    Diagnoses and all orders for this visit:    1. Essential hypertension (Primary)  -     TSH; Future    2. Anxiety    3. Gastroesophageal reflux disease with esophagitis without hemorrhage    4. Preventative health care  -     Comprehensive Metabolic Panel; Future  -     CBC (No Diff); Future  -     Hemoglobin A1c; Future  -     Lipid Panel; Future  -     TSH; Future    5. Mixed hyperlipidemia  -     Lipid Panel; Future    6. Prostate cancer screening  -     PSA Screen; Future    7. Acute midline low back pain without sciatica    8. Back muscle spasm    9. Acquired skin tag    Other orders  -     predniSONE (DELTASONE) 10 MG tablet; Take 5 po for 2 days, Take 4 for 2 days, then 3 for 2 days, then 2 for 2 days, then 1 for 5 days, then stop  Dispense: 33 tablet; Refill: 0  -     baclofen (LIORESAL) 10 MG tablet; Take 1 tablet by mouth 3 (Three) Times a Day As Needed for Muscle Spasms.  Dispense: 30 tablet; Refill: 0  -     ibuprofen (ADVIL,MOTRIN) 800 MG tablet; Take 1 tablet by mouth Every 8 (Eight) Hours As Needed for Moderate Pain.  Dispense: 90 tablet; Refill: 1      -Chronic conditions are stable  -Pt will return for fasting labs tomorrow am  -Continue current med regimen  -Start pred taper, baclofen and ibuprofen, notify in 1-2 weeks if wonb, will then check xrays. rec heat, rest and proper body mechanics. Consider PT  -Age appropriate preventative counseling provided, including healthy lifestyle modifications and exercise  -provided work note to be off this week, return on June 5.       I spent 30 minutes caring for Lonnie Green on this date of service. This time includes time spent by me in the  following activities: preparing for the visit, reviewing tests, performing a medically appropriate examination and/or evaluation , counseling and educating the patient/family/caregiver, ordering medications, tests, or procedures and documenting information in the medical record        Follow Up     Return in about 1 year (around 5/30/2024) for Annual physical. pt will return for labs in am. can schedule skin tag removal.  Patient was given instructions and counseling regarding his condition or for health maintenance advice. Please see specific information pulled into the AVS if appropriate.        Part of this note may be an electronic transcription/translation of spoken language to printed text using the Dragon Dictation System

## 2023-06-05 ENCOUNTER — TELEPHONE (OUTPATIENT)
Dept: FAMILY MEDICINE CLINIC | Facility: CLINIC | Age: 43
End: 2023-06-05
Payer: COMMERCIAL

## 2023-06-06 ENCOUNTER — CLINICAL SUPPORT (OUTPATIENT)
Dept: FAMILY MEDICINE CLINIC | Facility: CLINIC | Age: 43
End: 2023-06-06
Payer: COMMERCIAL

## 2023-06-06 DIAGNOSIS — I10 ESSENTIAL HYPERTENSION: ICD-10-CM

## 2023-06-06 DIAGNOSIS — Z00.00 PREVENTATIVE HEALTH CARE: ICD-10-CM

## 2023-06-06 DIAGNOSIS — Z12.5 PROSTATE CANCER SCREENING: ICD-10-CM

## 2023-06-06 DIAGNOSIS — E78.2 MIXED HYPERLIPIDEMIA: ICD-10-CM

## 2023-06-06 PROCEDURE — 83036 HEMOGLOBIN GLYCOSYLATED A1C: CPT | Performed by: NURSE PRACTITIONER

## 2023-06-06 PROCEDURE — G0103 PSA SCREENING: HCPCS | Performed by: NURSE PRACTITIONER

## 2023-06-06 PROCEDURE — 80061 LIPID PANEL: CPT | Performed by: NURSE PRACTITIONER

## 2023-06-06 PROCEDURE — 36415 COLL VENOUS BLD VENIPUNCTURE: CPT | Performed by: NURSE PRACTITIONER

## 2023-06-06 PROCEDURE — 80050 GENERAL HEALTH PANEL: CPT | Performed by: NURSE PRACTITIONER

## 2023-06-06 NOTE — PROGRESS NOTES
Venipuncture Blood Specimen Collection  Venipuncture performed in the left arm by Nicolette Broussard MA with good hemostasis. Patient tolerated the procedure well without complications.   06/06/23   Nicolette Broussard MA

## 2023-06-07 LAB
ALBUMIN SERPL-MCNC: 4.6 G/DL (ref 3.5–5.2)
ALBUMIN/GLOB SERPL: 1.6 G/DL
ALP SERPL-CCNC: 78 U/L (ref 39–117)
ALT SERPL W P-5'-P-CCNC: 31 U/L (ref 1–41)
ANION GAP SERPL CALCULATED.3IONS-SCNC: 8 MMOL/L (ref 5–15)
AST SERPL-CCNC: 21 U/L (ref 1–40)
BILIRUB SERPL-MCNC: 0.4 MG/DL (ref 0–1.2)
BUN SERPL-MCNC: 20 MG/DL (ref 6–20)
BUN/CREAT SERPL: 22.7 (ref 7–25)
CALCIUM SPEC-SCNC: 9.1 MG/DL (ref 8.6–10.5)
CHLORIDE SERPL-SCNC: 102 MMOL/L (ref 98–107)
CHOLEST SERPL-MCNC: 205 MG/DL (ref 0–200)
CO2 SERPL-SCNC: 28 MMOL/L (ref 22–29)
CREAT SERPL-MCNC: 0.88 MG/DL (ref 0.76–1.27)
DEPRECATED RDW RBC AUTO: 38.9 FL (ref 37–54)
EGFRCR SERPLBLD CKD-EPI 2021: 109.4 ML/MIN/1.73
ERYTHROCYTE [DISTWIDTH] IN BLOOD BY AUTOMATED COUNT: 12.5 % (ref 12.3–15.4)
GLOBULIN UR ELPH-MCNC: 2.8 GM/DL
GLUCOSE SERPL-MCNC: 89 MG/DL (ref 65–99)
HBA1C MFR BLD: 5.9 % (ref 4.8–5.6)
HCT VFR BLD AUTO: 45.5 % (ref 37.5–51)
HDLC SERPL-MCNC: 50 MG/DL (ref 40–60)
HGB BLD-MCNC: 15 G/DL (ref 13–17.7)
LDLC SERPL CALC-MCNC: 129 MG/DL (ref 0–100)
LDLC/HDLC SERPL: 2.51 {RATIO}
MCH RBC QN AUTO: 28.6 PG (ref 26.6–33)
MCHC RBC AUTO-ENTMCNC: 33 G/DL (ref 31.5–35.7)
MCV RBC AUTO: 86.8 FL (ref 79–97)
PLATELET # BLD AUTO: 428 10*3/MM3 (ref 140–450)
PMV BLD AUTO: 9.8 FL (ref 6–12)
POTASSIUM SERPL-SCNC: 4.6 MMOL/L (ref 3.5–5.2)
PROT SERPL-MCNC: 7.4 G/DL (ref 6–8.5)
PSA SERPL-MCNC: 0.27 NG/ML (ref 0–4)
RBC # BLD AUTO: 5.24 10*6/MM3 (ref 4.14–5.8)
SODIUM SERPL-SCNC: 138 MMOL/L (ref 136–145)
TRIGL SERPL-MCNC: 147 MG/DL (ref 0–150)
TSH SERPL DL<=0.05 MIU/L-ACNC: 1.17 UIU/ML (ref 0.27–4.2)
VLDLC SERPL-MCNC: 26 MG/DL (ref 5–40)
WBC NRBC COR # BLD: 10.85 10*3/MM3 (ref 3.4–10.8)

## 2023-07-28 RX ORDER — METOPROLOL SUCCINATE 50 MG/1
50 TABLET, EXTENDED RELEASE ORAL DAILY
Qty: 90 TABLET | Refills: 1 | Status: SHIPPED | OUTPATIENT
Start: 2023-07-28

## 2023-07-28 NOTE — TELEPHONE ENCOUNTER
Incoming Refill Request      Medication requested (name and dose):   metoprolol succinate XL (TOPROL-XL) 50 MG 24 hr tablet  50 mg, Daily       Pharmacy where request should be sent: [facility] #[id number] [address] [place] [address] - [phone]  - [phone]     Additional details provided by patient: PATIENT IS OUT OF MEDICATION    Best call back number: [phone]    Does the patient have less than a 3 day supply:  [x] Yes  [] No    Marycarmen Preston Rep  07/28/23, 12:37 EDT

## 2023-08-03 ENCOUNTER — OFFICE VISIT (OUTPATIENT)
Dept: FAMILY MEDICINE CLINIC | Facility: CLINIC | Age: 43
End: 2023-08-03
Payer: COMMERCIAL

## 2023-08-03 VITALS
DIASTOLIC BLOOD PRESSURE: 85 MMHG | BODY MASS INDEX: 40.43 KG/M2 | OXYGEN SATURATION: 97 % | SYSTOLIC BLOOD PRESSURE: 133 MMHG | WEIGHT: 315 LBS | HEART RATE: 64 BPM | HEIGHT: 74 IN

## 2023-08-03 DIAGNOSIS — L91.8 ACQUIRED SKIN TAG: Primary | ICD-10-CM

## 2023-08-03 DIAGNOSIS — L82.1 SK (SEBORRHEIC KERATOSIS): ICD-10-CM

## 2023-09-27 DIAGNOSIS — F41.9 ANXIETY: ICD-10-CM

## 2023-09-27 DIAGNOSIS — I10 ESSENTIAL HYPERTENSION: ICD-10-CM

## 2023-09-27 RX ORDER — LISINOPRIL 10 MG/1
10 TABLET ORAL DAILY
Qty: 90 TABLET | Refills: 1 | Status: SHIPPED | OUTPATIENT
Start: 2023-09-27

## 2023-09-27 RX ORDER — ESCITALOPRAM OXALATE 10 MG/1
10 TABLET ORAL DAILY
Qty: 90 TABLET | Refills: 1 | Status: SHIPPED | OUTPATIENT
Start: 2023-09-27

## 2023-09-27 NOTE — TELEPHONE ENCOUNTER
Caller: Lonnie Green DEVEN    Relationship: Self    Best call back number: [phone]    Requested Prescriptions:   Requested Prescriptions     Pending Prescriptions Disp Refills    lisinopril (PRINIVIL,ZESTRIL) 10 MG tablet 90 tablet 1     Sig: Take 1 tablet by mouth Daily.    escitalopram (LEXAPRO) 10 MG tablet 90 tablet 1     Sig: Take 1 tablet by mouth Daily.        Pharm[facility] be [facility]: [id number] [place] [address] [address] [place] [address] - [phone] [facility] [phone] FX     Last office visit with prescribing clinician: 8/3/2023   Last telemedicine visit with prescribing clinician: Visit date not found   Next office visit with prescribing clinician: 6/4/2024     Additional details provided by patient:     Does the patient have less than a 3 day supply:  [x] Yes  [] No        Marycarmen Daniels Rep   09/27/23 09:20 EDT

## 2023-11-17 ENCOUNTER — TELEPHONE (OUTPATIENT)
Dept: FAMILY MEDICINE CLINIC | Facility: CLINIC | Age: 43
End: 2023-11-17
Payer: COMMERCIAL

## 2023-11-17 NOTE — TELEPHONE ENCOUNTER
Attempted to call pt with providers response, no answer: per verbal left msg with providers response and to call office with any questions

## 2023-11-17 NOTE — TELEPHONE ENCOUNTER
Pt called in with concern of fluctuating HR and elevated BP. Pt states HR can be anywhere form  at rest and 120 with little activity. Pt states BP has been elevated at times with a normal /85 and will get as high as 150/100. Pt notices BP elevates at different times and can tell when it gets high. Pt states BP and HR started a couple of weeks ago. Pt states he has been on the same BP meds for several years and isn't sure if modification is needed. Please advise

## 2023-12-14 DIAGNOSIS — K21.00 GASTROESOPHAGEAL REFLUX DISEASE WITH ESOPHAGITIS WITHOUT HEMORRHAGE: ICD-10-CM

## 2023-12-15 RX ORDER — PANTOPRAZOLE SODIUM 40 MG/1
TABLET, DELAYED RELEASE ORAL
Qty: 180 TABLET | Refills: 3 | Status: SHIPPED | OUTPATIENT
Start: 2023-12-15

## 2024-01-05 RX ORDER — METOPROLOL SUCCINATE 50 MG/1
50 TABLET, EXTENDED RELEASE ORAL DAILY
Qty: 90 TABLET | Refills: 1 | Status: SHIPPED | OUTPATIENT
Start: 2024-01-05

## 2024-03-08 RX ORDER — METOPROLOL SUCCINATE 50 MG/1
TABLET, EXTENDED RELEASE ORAL
Qty: 180 TABLET | Refills: 1 | Status: SHIPPED | OUTPATIENT
Start: 2024-03-08

## 2024-03-08 NOTE — TELEPHONE ENCOUNTER
Caller: Lonnie Green    Relationship: Self    Requested Prescriptions:   Requested Prescriptions     Pending Prescriptions Disp Refills    metoprolol succinate XL (TOPROL-XL) 50 MG 24 hr tablet 90 tablet 1     Sig: Take 1 tablet by mouth Daily.        Pharmacy where request should be sent:  [facility] #[id number] [facility] [address] [facility] [address] [facility] [address] [place] [address] - [phone]  - [phone] FX     Last office visit with prescribing clinician: 8/3/2023   Last telemedicine visit with prescribing clinician: Visit date not found   Next office visit with prescribing clinician: 6/4/2024     Additional details provided by patient: PATIENT STATES THAT HE WAS ON 50 MG, BUT THAT ANDERSON INCREASED IT TO 75 MG PER DAY.   HE STATES THAT WAS NOT WHAT WAS CALLED IN THE LAST TIME, SO PATIENT STATES HE IS OUT EARLY.     PATIENT IS WANTING THIS CALLED IN AS A 50 MG AND 25 MG AS PATIENT DOES NOT THINK THIS COMES IN A 75 MG TABLET.     .      Does the patient have less than a 3 day supply:  [x] Yes  [] No    Would you like a call back once the refill request has been completed: [] Yes [x] No    If the office needs to give you a call back, can they leave a voicemail: [] Yes [x] No    Marycarmen Paige Rep   03/08/24 09:17 EST

## 2024-03-08 NOTE — TELEPHONE ENCOUNTER
PT has refill but is is taking 75mg daily , 50mg in morning and split second pill to 25 mg in evening since november.

## 2024-03-14 ENCOUNTER — PRIOR AUTHORIZATION (OUTPATIENT)
Dept: FAMILY MEDICINE CLINIC | Facility: CLINIC | Age: 44
End: 2024-03-14
Payer: COMMERCIAL

## 2024-03-14 NOTE — TELEPHONE ENCOUNTER
(Key: [id number]) Outcome    Your PA has been resolved, no additional PA is required. For further inquiries please contact the number on the back of the member prescription card.

## 2024-05-01 DIAGNOSIS — F41.9 ANXIETY: ICD-10-CM

## 2024-05-01 DIAGNOSIS — I10 ESSENTIAL HYPERTENSION: ICD-10-CM

## 2024-05-01 RX ORDER — ESCITALOPRAM OXALATE 10 MG/1
10 TABLET ORAL DAILY
Qty: 90 TABLET | Refills: 1 | Status: SHIPPED | OUTPATIENT
Start: 2024-05-01

## 2024-05-01 RX ORDER — LISINOPRIL 10 MG/1
10 TABLET ORAL DAILY
Qty: 90 TABLET | Refills: 1 | Status: SHIPPED | OUTPATIENT
Start: 2024-05-01

## 2024-05-01 NOTE — TELEPHONE ENCOUNTER
Caller: Lonnie Green    Relationship: Self    Best call back number: [phone]     Requested Prescriptions:   Requested Prescriptions     Pending Prescriptions Disp Refills    lisinopril (PRINIVIL,ZESTRIL) 10 MG tablet 90 tablet 1     Sig: Take 1 tablet by mouth Daily.    escitalopram (LEXAPRO) 10 MG tablet 90 tablet 1     Sig: Take 1 tablet by mouth Daily.        Pharm[facility] be sent: [id number] [place] [address] AT [place] [address] - [phone] [facility] [phone] FX     Last office visit with prescribing clinician: 8/3/2023   Last telemedicine visit with prescribing clinician: Visit date not found   Next office visit with prescribing clinician: 6/4/2024     Additional details provided by patient: PATIENT IS OUT OF THESE MEDICATIONS.    Does the patient have less than a 3 day supply:  [x] Yes  [] No    Would you like a call back once the refill request has been completed: [] Yes [] No    If the office needs to give you a call back, can they leave a voicemail: [] Yes [] No    April Marycarmen Fitch Rep   05/01/24 15:08 EDT

## 2024-05-03 ENCOUNTER — OFFICE VISIT (OUTPATIENT)
Dept: FAMILY MEDICINE CLINIC | Facility: CLINIC | Age: 44
End: 2024-05-03
Payer: COMMERCIAL

## 2024-05-03 VITALS
TEMPERATURE: 98 F | RESPIRATION RATE: 18 BRPM | HEART RATE: 69 BPM | WEIGHT: 308.6 LBS | SYSTOLIC BLOOD PRESSURE: 128 MMHG | DIASTOLIC BLOOD PRESSURE: 80 MMHG | BODY MASS INDEX: 39.61 KG/M2 | HEIGHT: 74 IN | OXYGEN SATURATION: 96 %

## 2024-05-03 DIAGNOSIS — L23.7 POISON IVY DERMATITIS: Primary | ICD-10-CM

## 2024-05-03 RX ORDER — LISINOPRIL 10 MG/1
1 TABLET ORAL DAILY
COMMUNITY
End: 2024-05-03

## 2024-05-03 RX ORDER — METHYLPREDNISOLONE SODIUM SUCCINATE 125 MG/2ML
125 INJECTION, POWDER, LYOPHILIZED, FOR SOLUTION INTRAMUSCULAR; INTRAVENOUS ONCE
Status: COMPLETED | OUTPATIENT
Start: 2024-05-03 | End: 2024-05-03

## 2024-05-03 RX ORDER — METOPROLOL SUCCINATE 50 MG/1
1 TABLET, EXTENDED RELEASE ORAL DAILY
COMMUNITY
End: 2024-05-03

## 2024-05-03 RX ORDER — PREDNISONE 20 MG/1
TABLET ORAL
Qty: 20 TABLET | Refills: 0 | Status: SHIPPED | OUTPATIENT
Start: 2024-05-03

## 2024-05-03 RX ORDER — PANTOPRAZOLE SODIUM 40 MG/1
1 TABLET, DELAYED RELEASE ORAL DAILY
COMMUNITY
End: 2024-05-03

## 2024-05-03 RX ADMIN — METHYLPREDNISOLONE SODIUM SUCCINATE 125 MG: 125 INJECTION, POWDER, LYOPHILIZED, FOR SOLUTION INTRAMUSCULAR; INTRAVENOUS at 16:30

## 2024-05-03 NOTE — PROGRESS NOTES
"Chief Complaint  Poison Ivy (Started about 3 days ago. Was outside weed eating. Both arms and around eyes. )    Subjective        Lonnie Green presents to [facility]  History of Present Illness    Rash:  Onset of rash began 3 days ago. Frequency is daily/continuously. Severity is moderate. Status is worse. Location of affected area include bilateral arms, now itching around eyes. Quality is erythematous, papular, vesicular, and weepy. Weed eating in yard around poison ivy. Last episode 5 years ago. Aggravated by heat. Relieved by cool compress and masoud dish soap. Daughter getting  next week. Associated symptoms itching, edema (arm AC space).  Pertinent negatives include bleeding, bleeding skin, cracking, crusting, dry skin, scaling, hyperpigmentation, hypopigmentation, diarrhea, fatigue, fever, sore throat, headache, joint symptoms, myalgia, pain, and vomiting.       Objective   Vital Signs:  /80 (BP Location: Left arm, Patient Position: Sitting, Cuff Size: Adult)   Pulse 69   Temp 98 °F (36.7 °C)   Resp 18   Ht 188 cm (74\")   Wt (!) 140 kg (308 lb 9.6 oz)   SpO2 96%   BMI 39.62 kg/m²   Estimated body mass index is 39.62 kg/m² as calculated from the following:    Height as of this encounter: 188 cm (74\").    Weight as of this encounter: 140 kg (308 lb 9.6 oz).               Physical Exam  Constitutional:       General: He is not in acute distress.     Appearance: He is obese.   Cardiovascular:      Rate and Rhythm: Normal rate.      Heart sounds: Normal heart sounds, S1 normal and S2 normal.   Pulmonary:      Effort: Pulmonary effort is normal.      Breath sounds: Normal breath sounds and air entry.   Skin:     General: Skin is warm and dry.      Findings: Rash present. Rash is papular and vesicular.      Comments: Bilateral arms with scattered erythematous eruption of papules and vesicles. Left side of face near eyebrow with faint erythema.   Neurological:      " Mental Status: He is alert and oriented to person, place, and time.      Gait: Gait is intact.   Psychiatric:         Mood and Affect: Mood normal.         Thought Content: Thought content normal.        Result Review :                     Assessment and Plan     Diagnoses and all orders for this visit:    1. Poison ivy dermatitis (Primary)  -     methylPREDNISolone sodium succinate (SOLU-Medrol) injection 125 mg  -     predniSONE (DELTASONE) 20 MG tablet; Take 3 tabs daily x 3 days, 2 tabs daily x 3 days, 1 tab daily x 3 days, 1/2 tab daily x 4 days.  Dispense: 20 tablet; Refill: 0      Solumedrol 125 mg injection IM in office today. Start prednisone taper tomorrow.        Follow Up     Return if symptoms worsen or fail to improve.  Patient was given instructions and counseling regarding his condition or for health maintenance advice. Please see specific information pulled into the AVS if appropriate.

## 2024-05-03 NOTE — PROGRESS NOTES
Injection  Injection performed in left glute by Yasmin Odom MA. Patient tolerated the procedure well without complications.  05/03/24   Yasmin Odom MA

## 2024-06-04 ENCOUNTER — OFFICE VISIT (OUTPATIENT)
Dept: FAMILY MEDICINE CLINIC | Facility: CLINIC | Age: 44
End: 2024-06-04
Payer: COMMERCIAL

## 2024-06-04 VITALS
HEART RATE: 68 BPM | SYSTOLIC BLOOD PRESSURE: 114 MMHG | TEMPERATURE: 98.6 F | WEIGHT: 304 LBS | OXYGEN SATURATION: 95 % | DIASTOLIC BLOOD PRESSURE: 78 MMHG | BODY MASS INDEX: 39.01 KG/M2 | HEIGHT: 74 IN

## 2024-06-04 DIAGNOSIS — Z12.5 PROSTATE CANCER SCREENING: ICD-10-CM

## 2024-06-04 DIAGNOSIS — L23.7 POISON IVY DERMATITIS: ICD-10-CM

## 2024-06-04 DIAGNOSIS — F41.9 ANXIETY: ICD-10-CM

## 2024-06-04 DIAGNOSIS — Z00.00 PREVENTATIVE HEALTH CARE: ICD-10-CM

## 2024-06-04 DIAGNOSIS — I10 ESSENTIAL HYPERTENSION: Primary | ICD-10-CM

## 2024-06-04 DIAGNOSIS — K21.00 GASTROESOPHAGEAL REFLUX DISEASE WITH ESOPHAGITIS WITHOUT HEMORRHAGE: ICD-10-CM

## 2024-06-04 PROCEDURE — 99396 PREV VISIT EST AGE 40-64: CPT | Performed by: NURSE PRACTITIONER

## 2024-06-04 NOTE — PROGRESS NOTES
Chief Complaint  Chief Complaint   Patient presents with    Annual Exam           Subjective          Lonnie Green presents to [facility] for   History of Present Illness    Patient presents for annual exam he is doing well and has no complaints today    HTN, stable on meds and takes as directed, denies chest pain, headache, shortness of air, palpitations and swelling of extremities.     GERD, stable on medication, denies nausea, vomiting, constipation, abdominal pain and diarrhea.    Anxiety, patient denies significant weight loss/gain, insomnia, hypersomnia, psychomotor agitation, psychomotor retardation, fatigue (loss of energy), feelings of worthlessness (guilt), impaired concentration (indecisiveness), thoughts of death or suicide.       He has poison ivy again but resolving      The following portions of the patient's history were reviewed and updated as appropriate: allergies, current medications, past family history, past medical history, past social history, past surgical history and problem list.    Past Medical History:   Diagnosis Date    Anxiety     GERD (gastroesophageal reflux disease)     Hypertension     Incisional hernia     Pneumonia     Tachycardia     Umbilical hernia      Past Surgical History:   Procedure Laterality Date    CHOLECYSTECTOMY  2013    INCISIONAL HERNIA REPAIR  2015    TONSILLECTOMY       Family History   Problem Relation Age of Onset    Diabetes Mother     Stroke Mother     Hypertension Father     Hypertension Other     Cancer Other         [facility]     Social History     Tobacco Use    Smoking status: Former     Current packs/day: 0.00     Average packs/day: 1 pack/day for 15.0 years (15.0 ttl pk-yrs)     Types: Cigarettes     Start date: 2000     Quit date: 2005     Years since quittin.4     Passive exposure: Past    Smokeless tobacco: Never   Substance Use Topics    Alcohol use:  "Yes       Current Outpatient Medications:     escitalopram (LEXAPRO) 10 MG tablet, Take 1 tablet by mouth Daily., Disp: 90 tablet, Rfl: 1    lisinopril (PRINIVIL,ZESTRIL) 10 MG tablet, Take 1 tablet by mouth Daily., Disp: 90 tablet, Rfl: 1    metoprolol succinate XL (TOPROL-XL) 50 MG 24 hr tablet, Take 1.5 tabs po in am and 1/2 tab at night (Patient taking differently: Take 1 tablet by mouth Daily. Take 1 tabs po in am and 1/2 tab at night), Disp: 180 tablet, Rfl: 1    pantoprazole (PROTONIX) 40 MG EC tablet, TAKE 1 TABLET BY MOUTH TWICE DAILY, Disp: 180 tablet, Rfl: 3    Objective   Vital Signs:   /78 (BP Location: Right arm, Patient Position: Sitting, Cuff Size: Adult)   Pulse 68   Temp 98.6 °F (37 °C) (Temporal)   Ht 188 cm (74\")   Wt (!) 138 kg (304 lb)   SpO2 95%   BMI 39.03 kg/m²           Physical Exam  Constitutional:       General: He is not in acute distress.     Appearance: Normal appearance. He is well-developed. He is not ill-appearing or diaphoretic.   HENT:      Head: Normocephalic.   Eyes:      Conjunctiva/sclera: Conjunctivae normal.      Pupils: Pupils are equal, round, and reactive to light.   Neck:      Thyroid: No thyromegaly.      Vascular: No JVD.   Cardiovascular:      Rate and Rhythm: Normal rate and regular rhythm.      Heart sounds: Normal heart sounds. No murmur heard.  Pulmonary:      Effort: Pulmonary effort is normal. No respiratory distress.      Breath sounds: Normal breath sounds. No wheezing or rhonchi.   Abdominal:      General: Bowel sounds are normal. There is no distension.      Palpations: Abdomen is soft.      Tenderness: There is no abdominal tenderness.   Musculoskeletal:         General: No swelling or tenderness. Normal range of motion.      Cervical back: Normal range of motion and neck supple. No tenderness.   Lymphadenopathy:      Cervical: No cervical adenopathy.   Skin:     General: Skin is warm and dry.      Coloration: Skin is not jaundiced.      " Findings: Rash (scattered BUE poison ivy patches) present. No erythema.   Neurological:      General: No focal deficit present.      Mental Status: He is alert and oriented to person, place, and time. Mental status is at baseline.      Sensory: No sensory deficit.      Motor: No weakness.      Gait: Gait normal.   Psychiatric:         Mood and Affect: Mood normal.         Behavior: Behavior normal.         Thought Content: Thought content normal.         Judgment: Judgment normal.          Result Review :     No visits with results within 7 Day(s) from this visit.   Latest known visit with results is:   Clinical Support on 06/06/2023   Component Date Value Ref Range Status    Glucose 06/06/2023 89  65 - 99 mg/dL Final    BUN 06/06/2023 20  6 - 20 mg/dL Final    Creatinine 06/06/2023 0.88  0.76 - 1.27 mg/dL Final    Sodium 06/06/2023 138  136 - 145 mmol/L Final    Potassium 06/06/2023 4.6  3.5 - 5.2 mmol/L Final    Chloride 06/06/2023 102  98 - 107 mmol/L Final    CO2 06/06/2023 28.0  22.0 - 29.0 mmol/L Final    Calcium 06/06/2023 9.1  8.6 - 10.5 mg/dL Final    Total Protein 06/06/2023 7.4  6.0 - 8.5 g/dL Final    Albumin 06/06/2023 4.6  3.5 - 5.2 g/dL Final    ALT (SGPT) 06/06/2023 31  1 - 41 U/L Final    AST (SGOT) 06/06/2023 21  1 - 40 U/L Final    Alkaline Phosphatase 06/06/2023 78  39 - 117 U/L Final    Total Bilirubin 06/06/2023 0.4  0.0 - 1.2 mg/dL Final    Globulin 06/06/2023 2.8  gm/dL Final    A/G Ratio 06/06/2023 1.6  g/dL Final    BUN/Creatinine Ratio 06/06/2023 22.7  7.0 - 25.0 Final    Anion Gap 06/06/2023 8.0  5.0 - 15.0 mmol/L Final    eGFR 06/06/2023 109.4  >60.0 mL/min/1.73 Final    WBC 06/06/2023 10.85 (H)  3.40 - 10.80 10*3/mm3 Final    RBC 06/06/2023 5.24  4.14 - 5.80 10*6/mm3 Final    Hemoglobin 06/06/2023 15.0  13.0 - 17.7 g/dL Final    Hematocrit 06/06/2023 45.5  37.5 - 51.0 % Final    MCV 06/06/2023 86.8  79.0 - 97.0 fL Final    MCH 06/06/2023 28.6  26.6 - 33.0 pg Final    MCHC 06/06/2023  33.0  31.5 - 35.7 g/dL Final    RDW 06/06/2023 12.5  12.3 - 15.4 % Final    RDW-SD 06/06/2023 38.9  37.0 - 54.0 fl Final    MPV 06/06/2023 9.8  6.0 - 12.0 fL Final    Platelets 06/06/2023 428  140 - 450 10*3/mm3 Final    Hemoglobin A1C 06/06/2023 5.90 (H)  4.80 - 5.60 % Final    Total Cholesterol 06/06/2023 205 (H)  0 - 200 mg/dL Final    Triglycerides 06/06/2023 147  0 - 150 mg/dL Final    HDL Cholesterol 06/06/2023 50  40 - 60 mg/dL Final    LDL Cholesterol  06/06/2023 129 (H)  0 - 100 mg/dL Final    VLDL Cholesterol 06/06/2023 26  5 - 40 mg/dL Final    LDL/HDL Ratio 06/06/2023 2.51   Final    TSH 06/06/2023 1.170  0.270 - 4.200 uIU/mL Final    PSA 06/06/2023 0.265  0.000 - 4.000 ng/mL Final                  Class 2 Severe Obesity (BMI >=35 and <=39.9). Obesity-related health conditions include the following: hypertension, dyslipidemias, and GERD. Obesity is unchanged. BMI is is above average; BMI management plan is completed. We discussed portion control and increasing exercise.           Assessment and Plan    Diagnoses and all orders for this visit:    1. Essential hypertension (Primary)    2. Poison ivy dermatitis    3. Anxiety    4. Gastroesophageal reflux disease with esophagitis without hemorrhage    5. Preventative health care  -     Lipid Panel; Future  -     Comprehensive Metabolic Panel; Future  -     CBC (No Diff); Future  -     TSH; Future  -     PSA Screen; Future    6. Prostate cancer screening  -     PSA Screen; Future      Overall doing well  No medication refills needed at this time, continue current med regimen  BP wnl, continue metop 50mg in am 25mg at hs.   Patient will return for labs later this week fasting  Age appropriate preventative counseling provided, including healthy lifestyle modifications and exercise        I spent 20 minutes caring for Lonnie Green on this date of service. This time includes time spent by me in the following activities: preparing for the visit, reviewing tests,  performing a medically appropriate examination and/or evaluation , counseling and educating the patient/family/caregiver, ordering medications, tests, or procedures and documenting information in the medical record        Follow Up     Return in about 1 year (around 6/4/2025) for Annual physical. pt to rtc on friday for fasting labs.  Patient was given instructions and counseling regarding his condition or for health maintenance advice. Please see specific information pulled into the AVS if appropriate.        Part of this note may be an electronic transcription/translation of spoken language to printed text using the Dragon Dictation System

## 2024-06-07 ENCOUNTER — LAB (OUTPATIENT)
Dept: FAMILY MEDICINE CLINIC | Facility: CLINIC | Age: 44
End: 2024-06-07
Payer: COMMERCIAL

## 2024-06-07 DIAGNOSIS — Z12.5 PROSTATE CANCER SCREENING: ICD-10-CM

## 2024-06-07 DIAGNOSIS — Z00.00 PREVENTATIVE HEALTH CARE: ICD-10-CM

## 2024-06-07 PROCEDURE — 80050 GENERAL HEALTH PANEL: CPT | Performed by: NURSE PRACTITIONER

## 2024-06-07 PROCEDURE — 36415 COLL VENOUS BLD VENIPUNCTURE: CPT

## 2024-06-07 PROCEDURE — G0103 PSA SCREENING: HCPCS | Performed by: NURSE PRACTITIONER

## 2024-06-07 PROCEDURE — 80061 LIPID PANEL: CPT | Performed by: NURSE PRACTITIONER

## 2024-06-08 LAB
ALBUMIN SERPL-MCNC: 4.1 G/DL (ref 3.5–5.2)
ALBUMIN/GLOB SERPL: 1.5 G/DL
ALP SERPL-CCNC: 66 U/L (ref 39–117)
ALT SERPL W P-5'-P-CCNC: 24 U/L (ref 1–41)
ANION GAP SERPL CALCULATED.3IONS-SCNC: 12.7 MMOL/L (ref 5–15)
AST SERPL-CCNC: 18 U/L (ref 1–40)
BILIRUB SERPL-MCNC: 0.2 MG/DL (ref 0–1.2)
BUN SERPL-MCNC: 15 MG/DL (ref 6–20)
BUN/CREAT SERPL: 18.5 (ref 7–25)
CALCIUM SPEC-SCNC: 9.2 MG/DL (ref 8.6–10.5)
CHLORIDE SERPL-SCNC: 104 MMOL/L (ref 98–107)
CHOLEST SERPL-MCNC: 214 MG/DL (ref 0–200)
CO2 SERPL-SCNC: 24.3 MMOL/L (ref 22–29)
CREAT SERPL-MCNC: 0.81 MG/DL (ref 0.76–1.27)
DEPRECATED RDW RBC AUTO: 40.1 FL (ref 37–54)
EGFRCR SERPLBLD CKD-EPI 2021: 111.5 ML/MIN/1.73
ERYTHROCYTE [DISTWIDTH] IN BLOOD BY AUTOMATED COUNT: 12.4 % (ref 12.3–15.4)
GLOBULIN UR ELPH-MCNC: 2.7 GM/DL
GLUCOSE SERPL-MCNC: 89 MG/DL (ref 65–99)
HCT VFR BLD AUTO: 42.9 % (ref 37.5–51)
HDLC SERPL-MCNC: 36 MG/DL (ref 40–60)
HGB BLD-MCNC: 14.1 G/DL (ref 13–17.7)
LDLC SERPL CALC-MCNC: 137 MG/DL (ref 0–100)
LDLC/HDLC SERPL: 3.69 {RATIO}
MCH RBC QN AUTO: 29.3 PG (ref 26.6–33)
MCHC RBC AUTO-ENTMCNC: 32.9 G/DL (ref 31.5–35.7)
MCV RBC AUTO: 89.2 FL (ref 79–97)
PLATELET # BLD AUTO: 339 10*3/MM3 (ref 140–450)
PMV BLD AUTO: 9.6 FL (ref 6–12)
POTASSIUM SERPL-SCNC: 4.6 MMOL/L (ref 3.5–5.2)
PROT SERPL-MCNC: 6.8 G/DL (ref 6–8.5)
PSA SERPL-MCNC: 0.22 NG/ML (ref 0–4)
RBC # BLD AUTO: 4.81 10*6/MM3 (ref 4.14–5.8)
SODIUM SERPL-SCNC: 141 MMOL/L (ref 136–145)
TRIGL SERPL-MCNC: 226 MG/DL (ref 0–150)
TSH SERPL DL<=0.05 MIU/L-ACNC: 0.98 UIU/ML (ref 0.27–4.2)
VLDLC SERPL-MCNC: 41 MG/DL (ref 5–40)
WBC NRBC COR # BLD AUTO: 6.6 10*3/MM3 (ref 3.4–10.8)

## 2024-06-15 RX ORDER — ATORVASTATIN CALCIUM 20 MG/1
20 TABLET, FILM COATED ORAL NIGHTLY
Qty: 90 TABLET | Refills: 1 | Status: SHIPPED | OUTPATIENT
Start: 2024-06-15

## 2024-12-04 DIAGNOSIS — F41.9 ANXIETY: ICD-10-CM

## 2024-12-04 DIAGNOSIS — I10 ESSENTIAL HYPERTENSION: ICD-10-CM

## 2024-12-04 DIAGNOSIS — K21.00 GASTROESOPHAGEAL REFLUX DISEASE WITH ESOPHAGITIS WITHOUT HEMORRHAGE: ICD-10-CM

## 2024-12-04 RX ORDER — PANTOPRAZOLE SODIUM 40 MG/1
40 TABLET, DELAYED RELEASE ORAL 2 TIMES DAILY
Qty: 180 TABLET | Refills: 3 | Status: SHIPPED | OUTPATIENT
Start: 2024-12-04

## 2024-12-04 RX ORDER — LISINOPRIL 10 MG/1
10 TABLET ORAL DAILY
Qty: 90 TABLET | Refills: 1 | Status: SHIPPED | OUTPATIENT
Start: 2024-12-04

## 2024-12-04 RX ORDER — METOPROLOL SUCCINATE 50 MG/1
TABLET, EXTENDED RELEASE ORAL
Qty: 180 TABLET | Refills: 1 | Status: SHIPPED | OUTPATIENT
Start: 2024-12-04

## 2024-12-04 RX ORDER — ESCITALOPRAM OXALATE 10 MG/1
10 TABLET ORAL DAILY
Qty: 90 TABLET | Refills: 1 | Status: SHIPPED | OUTPATIENT
Start: 2024-12-04

## 2024-12-04 NOTE — TELEPHONE ENCOUNTER
Caller: Peter Lonnie W    Relationship: Self    Best call back number: [phone]    Requested Prescriptions:   Requested Prescriptions     Pending Prescriptions Disp Refills    lisinopril (PRINIVIL,ZESTRIL) 10 MG tablet 90 tablet 1     Sig: Take 1 tablet by mouth Daily.    pantoprazole (PROTONIX) 40 MG EC tablet 180 tablet 3     Sig: Take 1 tablet by mouth 2 (Two) Times a Day.    escitalopram (LEXAPRO) 10 MG tablet 90 tablet 1     Sig: Take 1 tablet by mouth Daily.    metoprolol succinate XL (TOPROL-XL) 50 MG 24 hr tablet 180 tablet 1     Sig: Take 1.5 tabs po in am and 1/2 tab at night        Pharmacy where request should be sent: [id number] [address] [place] [address] [address] [place] [address] - [phone]  - [phone] FX     Last office visit with prescribing clinician: 6/4/2024   Last telemedicine visit with prescribing clinician: Visit date not found   Next office visit with prescribing clinician: 6/12/2025     Additional details provided by patient:         Dominga Hilliard, TAD   12/04/24 09:28 EST

## 2024-12-15 DIAGNOSIS — K21.00 GASTROESOPHAGEAL REFLUX DISEASE WITH ESOPHAGITIS WITHOUT HEMORRHAGE: ICD-10-CM

## 2024-12-16 RX ORDER — PANTOPRAZOLE SODIUM 40 MG/1
40 TABLET, DELAYED RELEASE ORAL 2 TIMES DAILY
Qty: 180 TABLET | Refills: 3 | OUTPATIENT
Start: 2024-12-16

## 2025-07-16 ENCOUNTER — OFFICE VISIT (OUTPATIENT)
Dept: FAMILY MEDICINE CLINIC | Facility: CLINIC | Age: 45
End: 2025-07-16
Payer: COMMERCIAL

## 2025-07-16 VITALS
HEIGHT: 74 IN | DIASTOLIC BLOOD PRESSURE: 86 MMHG | WEIGHT: 305.8 LBS | SYSTOLIC BLOOD PRESSURE: 123 MMHG | OXYGEN SATURATION: 97 % | BODY MASS INDEX: 39.25 KG/M2 | HEART RATE: 72 BPM

## 2025-07-16 DIAGNOSIS — L23.7 POISON IVY DERMATITIS: Primary | ICD-10-CM

## 2025-07-16 RX ORDER — DEXAMETHASONE SODIUM PHOSPHATE 4 MG/ML
4 INJECTION, SOLUTION INTRA-ARTICULAR; INTRALESIONAL; INTRAMUSCULAR; INTRAVENOUS; SOFT TISSUE ONCE
Status: COMPLETED | OUTPATIENT
Start: 2025-07-16 | End: 2025-07-16

## 2025-07-16 RX ORDER — METHYLPREDNISOLONE 4 MG/1
TABLET ORAL
Qty: 21 TABLET | Refills: 0 | Status: SHIPPED | OUTPATIENT
Start: 2025-07-16

## 2025-07-16 RX ORDER — TRIAMCINOLONE ACETONIDE 5 MG/G
1 CREAM TOPICAL 3 TIMES DAILY
Qty: 454 G | Refills: 0 | Status: SHIPPED | OUTPATIENT
Start: 2025-07-16

## 2025-07-16 RX ADMIN — DEXAMETHASONE SODIUM PHOSPHATE 4 MG: 4 INJECTION, SOLUTION INTRA-ARTICULAR; INTRALESIONAL; INTRAMUSCULAR; INTRAVENOUS; SOFT TISSUE at 13:58

## 2025-07-16 NOTE — PROGRESS NOTES
Chief Complaint  Chief Complaint   Patient presents with    Rash     Pt was doing yard work recently. Rashy areas on arms, neck, back, tops of feet. Rash is spreading, skin blistering with oozing, clusters of raised blisters with redness. X 4 days  Pt has history of not tolerating oral steroids- increased irritability.            Subjective          Lonnie Green presents to [facility] for   History of Present Illness    Patient presents with itchy rash as mentioned in chief complaint, he has been clearing weeds and wooded area of his property.  Typically gets poison ivy at least once per year, has not tolerated oral steroids at high dose in the past.       The following portions of the patient's history were reviewed and updated as appropriate: allergies, current medications, past family history, past medical history, past social history, past surgical history and problem list.    Past Medical History:   Diagnosis Date    Anxiety     GERD (gastroesophageal reflux disease)     Hypertension     Incisional hernia     Pneumonia     Tachycardia     Umbilical hernia      Past Surgical History:   Procedure Laterality Date    CHOLECYSTECTOMY  2013    INCISIONAL HERNIA REPAIR  2015    TONSILLECTOMY       Family History   Problem Relation Age of Onset    Diabetes Mother     Stroke Mother     Hypertension Father     Alcohol abuse Father     Hypertension Other     Cancer Other         Abstraction from [facility] - Lung Cancer    Stroke Maternal Grandmother      Social History     Tobacco Use    Smoking status: Former     Current packs/day: 0.00     Average packs/day: 1 pack/day for 15.0 years (15.0 ttl pk-yrs)     Types: Cigarettes     Start date: 2000     Quit date: 2005     Years since quittin.5     Passive exposure: Past    Smokeless tobacco: Never   Substance Use Topics    Alcohol use: Yes     Comment: Drink occasionally but not every week       Current  "Outpatient Medications:     escitalopram (LEXAPRO) 10 MG tablet, Take 1 tablet by mouth Daily., Disp: 90 tablet, Rfl: 1    lisinopril (PRINIVIL,ZESTRIL) 10 MG tablet, Take 1 tablet by mouth Daily., Disp: 90 tablet, Rfl: 1    metoprolol succinate XL (TOPROL-XL) 50 MG 24 hr tablet, Take 1.5 tabs po in am and 1/2 tab at night, Disp: 180 tablet, Rfl: 1    pantoprazole (PROTONIX) 40 MG EC tablet, Take 1 tablet by mouth 2 (Two) Times a Day., Disp: 180 tablet, Rfl: 3    methylPREDNISolone (MEDROL) 4 MG dose pack, Take as directed on package instructions., Disp: 21 tablet, Rfl: 0    triamcinolone (KENALOG) 0.5 % cream, Apply 1 Application topically to the appropriate area as directed 3 (Three) Times a Day., Disp: 454 g, Rfl: 0    Objective   Vital Signs:   Vitals:    07/16/25 1143   BP: 123/86   BP Location: Left arm   Patient Position: Sitting   Cuff Size: Large Adult   Pulse: 72   SpO2: 97%   Weight: (!) 139 kg (305 lb 12.8 oz)   Height: 188 cm (74\")   PainSc: 0-No pain       Body mass index is 39.26 kg/m².    Physical Exam  Constitutional:       General: He is not in acute distress.     Appearance: Normal appearance.   Pulmonary:      Effort: Pulmonary effort is normal.   Musculoskeletal:         General: Normal range of motion.      Cervical back: Normal range of motion.   Skin:     General: Skin is warm and dry.      Findings: Erythema and rash (Pruritic, erythemic linear vesicles of the BUE, neck and back) present.   Neurological:      General: No focal deficit present.      Mental Status: He is alert.   Psychiatric:         Mood and Affect: Mood normal.         Behavior: Behavior normal.         Thought Content: Thought content normal.         Judgment: Judgment normal.          Result Review :     No visits with results within 7 Day(s) from this visit.   Latest known visit with results is:   Lab on 06/07/2024   Component Date Value Ref Range Status    Total Cholesterol 06/07/2024 214 (H)  0 - 200 mg/dL Final    " Triglycerides 06/07/2024 226 (H)  0 - 150 mg/dL Final    HDL Cholesterol 06/07/2024 36 (L)  40 - 60 mg/dL Final    LDL Cholesterol  06/07/2024 137 (H)  0 - 100 mg/dL Final    VLDL Cholesterol 06/07/2024 41 (H)  5 - 40 mg/dL Final    LDL/HDL Ratio 06/07/2024 3.69   Final    Glucose 06/07/2024 89  65 - 99 mg/dL Final    BUN 06/07/2024 15  6 - 20 mg/dL Final    Creatinine 06/07/2024 0.81  0.76 - 1.27 mg/dL Final    Sodium 06/07/2024 141  136 - 145 mmol/L Final    Potassium 06/07/2024 4.6  3.5 - 5.2 mmol/L Final    Chloride 06/07/2024 104  98 - 107 mmol/L Final    CO2 06/07/2024 24.3  22.0 - 29.0 mmol/L Final    Calcium 06/07/2024 9.2  8.6 - 10.5 mg/dL Final    Total Protein 06/07/2024 6.8  6.0 - 8.5 g/dL Final    Albumin 06/07/2024 4.1  3.5 - 5.2 g/dL Final    ALT (SGPT) 06/07/2024 24  1 - 41 U/L Final    AST (SGOT) 06/07/2024 18  1 - 40 U/L Final    Alkaline Phosphatase 06/07/2024 66  39 - 117 U/L Final    Total Bilirubin 06/07/2024 0.2  0.0 - 1.2 mg/dL Final    Globulin 06/07/2024 2.7  gm/dL Final    A/G Ratio 06/07/2024 1.5  g/dL Final    BUN/Creatinine Ratio 06/07/2024 18.5  7.0 - 25.0 Final    Anion Gap 06/07/2024 12.7  5.0 - 15.0 mmol/L Final    eGFR 06/07/2024 111.5  >60.0 mL/min/1.73 Final    WBC 06/07/2024 6.60  3.40 - 10.80 10*3/mm3 Final    RBC 06/07/2024 4.81  4.14 - 5.80 10*6/mm3 Final    Hemoglobin 06/07/2024 14.1  13.0 - 17.7 g/dL Final    Hematocrit 06/07/2024 42.9  37.5 - 51.0 % Final    MCV 06/07/2024 89.2  79.0 - 97.0 fL Final    MCH 06/07/2024 29.3  26.6 - 33.0 pg Final    MCHC 06/07/2024 32.9  31.5 - 35.7 g/dL Final    RDW 06/07/2024 12.4  12.3 - 15.4 % Final    RDW-SD 06/07/2024 40.1  37.0 - 54.0 fl Final    MPV 06/07/2024 9.6  6.0 - 12.0 fL Final    Platelets 06/07/2024 339  140 - 450 10*3/mm3 Final    TSH 06/07/2024 0.976  0.270 - 4.200 uIU/mL Final    PSA 06/07/2024 0.217  0.000 - 4.000 ng/mL Final                             Assessment and Plan    Diagnoses and all orders for this visit:    1.  Poison ivy dermatitis (Primary)  -     dexAMETHasone (DECADRON) injection 4 mg    Other orders  -     methylPREDNISolone (MEDROL) 4 MG dose pack; Take as directed on package instructions.  Dispense: 21 tablet; Refill: 0  -     triamcinolone (KENALOG) 0.5 % cream; Apply 1 Application topically to the appropriate area as directed 3 (Three) Times a Day.  Dispense: 454 g; Refill: 0      Decadron IM today  Start medrol tomorrow if needed  Use triamcin TID prn    I spent 20 minutes caring for Lonnie Green on this date of service. This time includes time spent by me in the following activities: preparing for the visit, reviewing tests, performing a medically appropriate examination and/or evaluation , counseling and educating the patient/family/caregiver, ordering medications, tests, or procedures and documenting information in the medical record        Follow Up     Return for Next scheduled follow up.  Patient was given instructions and counseling regarding his condition or for health maintenance advice. Please see specific information pulled into the AVS if appropriate.        Part of this note may be an electronic transcription/translation of spoken language to printed text using the Dragon Dictation System

## 2025-07-16 NOTE — PROGRESS NOTES
Injection  Injection performed in right ventrogluteal by Ana M Montenegro MA. Patient tolerated the procedure well without complications.  07/16/25   Ana M Montenegro MA

## 2025-07-23 DIAGNOSIS — F41.9 ANXIETY: ICD-10-CM

## 2025-07-23 DIAGNOSIS — I10 ESSENTIAL HYPERTENSION: ICD-10-CM

## 2025-07-24 RX ORDER — ESCITALOPRAM OXALATE 10 MG/1
10 TABLET ORAL DAILY
Qty: 90 TABLET | Refills: 1 | Status: SHIPPED | OUTPATIENT
Start: 2025-07-24

## 2025-07-24 RX ORDER — LISINOPRIL 10 MG/1
10 TABLET ORAL DAILY
Qty: 90 TABLET | Refills: 1 | Status: SHIPPED | OUTPATIENT
Start: 2025-07-24

## 2025-08-21 RX ORDER — METOPROLOL SUCCINATE 50 MG/1
TABLET, EXTENDED RELEASE ORAL
Qty: 180 TABLET | Refills: 1 | Status: SHIPPED | OUTPATIENT
Start: 2025-08-21